# Patient Record
Sex: FEMALE | Race: WHITE | NOT HISPANIC OR LATINO | Employment: FULL TIME | URBAN - METROPOLITAN AREA
[De-identification: names, ages, dates, MRNs, and addresses within clinical notes are randomized per-mention and may not be internally consistent; named-entity substitution may affect disease eponyms.]

---

## 2018-03-27 ENCOUNTER — OFFICE VISIT CONVERTED (OUTPATIENT)
Dept: FAMILY MEDICINE CLINIC | Facility: CLINIC | Age: 23
End: 2018-03-27
Attending: NURSE PRACTITIONER

## 2018-04-16 ENCOUNTER — CONVERSION ENCOUNTER (OUTPATIENT)
Dept: FAMILY MEDICINE CLINIC | Facility: CLINIC | Age: 23
End: 2018-04-16

## 2018-04-16 ENCOUNTER — OFFICE VISIT CONVERTED (OUTPATIENT)
Dept: FAMILY MEDICINE CLINIC | Facility: CLINIC | Age: 23
End: 2018-04-16
Attending: NURSE PRACTITIONER

## 2018-04-25 ENCOUNTER — OFFICE VISIT CONVERTED (OUTPATIENT)
Dept: FAMILY MEDICINE CLINIC | Facility: CLINIC | Age: 23
End: 2018-04-25
Attending: NURSE PRACTITIONER

## 2018-05-31 ENCOUNTER — OFFICE VISIT CONVERTED (OUTPATIENT)
Dept: FAMILY MEDICINE CLINIC | Facility: CLINIC | Age: 23
End: 2018-05-31
Attending: NURSE PRACTITIONER

## 2018-06-28 ENCOUNTER — OFFICE VISIT CONVERTED (OUTPATIENT)
Dept: FAMILY MEDICINE CLINIC | Facility: CLINIC | Age: 23
End: 2018-06-28
Attending: NURSE PRACTITIONER

## 2018-06-28 ENCOUNTER — CONVERSION ENCOUNTER (OUTPATIENT)
Dept: FAMILY MEDICINE CLINIC | Facility: CLINIC | Age: 23
End: 2018-06-28

## 2018-07-16 ENCOUNTER — OFFICE VISIT CONVERTED (OUTPATIENT)
Dept: FAMILY MEDICINE CLINIC | Facility: CLINIC | Age: 23
End: 2018-07-16
Attending: NURSE PRACTITIONER

## 2019-01-14 ENCOUNTER — OFFICE VISIT CONVERTED (OUTPATIENT)
Dept: FAMILY MEDICINE CLINIC | Facility: CLINIC | Age: 24
End: 2019-01-14
Attending: NURSE PRACTITIONER

## 2019-06-17 ENCOUNTER — CONVERSION ENCOUNTER (OUTPATIENT)
Dept: FAMILY MEDICINE CLINIC | Facility: CLINIC | Age: 24
End: 2019-06-17

## 2019-06-17 ENCOUNTER — OFFICE VISIT CONVERTED (OUTPATIENT)
Dept: FAMILY MEDICINE CLINIC | Facility: CLINIC | Age: 24
End: 2019-06-17
Attending: NURSE PRACTITIONER

## 2019-06-17 ENCOUNTER — HOSPITAL ENCOUNTER (OUTPATIENT)
Dept: FAMILY MEDICINE CLINIC | Facility: CLINIC | Age: 24
Discharge: HOME | End: 2019-06-17
Attending: NURSE PRACTITIONER

## 2019-06-17 LAB
25(OH)D3 SERPL-MCNC: 27.4 NG/ML (ref 30–100)
ALBUMIN SERPL-MCNC: 4.6 G/DL (ref 3.5–5)
ALBUMIN/GLOB SERPL: 1.9 {RATIO} (ref 1.4–2.6)
ALP SERPL-CCNC: 42 U/L (ref 42–98)
ALT SERPL-CCNC: 11 U/L (ref 10–40)
ANION GAP SERPL CALC-SCNC: 17 MMOL/L (ref 8–19)
AST SERPL-CCNC: 15 U/L (ref 15–50)
BASOPHILS # BLD AUTO: 0.04 10*3/UL (ref 0–0.2)
BASOPHILS NFR BLD AUTO: 0.4 % (ref 0–3)
BILIRUB SERPL-MCNC: 0.92 MG/DL (ref 0.2–1.3)
BUN SERPL-MCNC: 10 MG/DL (ref 5–25)
BUN/CREAT SERPL: 13 {RATIO} (ref 6–20)
CALCIUM SERPL-MCNC: 9 MG/DL (ref 8.7–10.4)
CHLORIDE SERPL-SCNC: 104 MMOL/L (ref 99–111)
CONV ABS IMM GRAN: 0.04 10*3/UL (ref 0–0.2)
CONV CO2: 26 MMOL/L (ref 22–32)
CONV IMMATURE GRAN: 0.4 % (ref 0–1.8)
CONV TOTAL PROTEIN: 7 G/DL (ref 6.3–8.2)
CREAT UR-MCNC: 0.75 MG/DL (ref 0.5–0.9)
DEPRECATED RDW RBC AUTO: 40.2 FL (ref 36.4–46.3)
EOSINOPHIL # BLD AUTO: 0.13 10*3/UL (ref 0–0.7)
EOSINOPHIL # BLD AUTO: 1.3 % (ref 0–7)
ERYTHROCYTE [DISTWIDTH] IN BLOOD BY AUTOMATED COUNT: 11.7 % (ref 11.7–14.4)
FERRITIN SERPL-MCNC: 56 NG/ML (ref 10–200)
FOLATE SERPL-MCNC: 16.6 NG/ML (ref 4.8–20)
GFR SERPLBLD BASED ON 1.73 SQ M-ARVRAT: >60 ML/MIN/{1.73_M2}
GLOBULIN UR ELPH-MCNC: 2.4 G/DL (ref 2–3.5)
GLUCOSE SERPL-MCNC: 82 MG/DL (ref 65–99)
HBA1C MFR BLD: 13.7 G/DL (ref 12–16)
HCT VFR BLD AUTO: 41.9 % (ref 37–47)
IRON SATN MFR SERPL: 47 % (ref 20–55)
IRON SERPL-MCNC: 152 UG/DL (ref 60–170)
LYMPHOCYTES # BLD AUTO: 2.15 10*3/UL (ref 1–5)
MCH RBC QN AUTO: 31.2 PG (ref 27–31)
MCHC RBC AUTO-ENTMCNC: 32.7 G/DL (ref 33–37)
MCV RBC AUTO: 95.4 FL (ref 81–99)
MONOCYTES # BLD AUTO: 0.83 10*3/UL (ref 0.2–1.2)
MONOCYTES NFR BLD AUTO: 8.5 % (ref 3–10)
NEUTROPHILS # BLD AUTO: 6.59 10*3/UL (ref 2–8)
NEUTROPHILS NFR BLD AUTO: 67.4 % (ref 30–85)
NRBC CBCN: 0 % (ref 0–0.7)
OSMOLALITY SERPL CALC.SUM OF ELEC: 292 MOSM/KG (ref 273–304)
PLATELET # BLD AUTO: 217 10*3/UL (ref 130–400)
PMV BLD AUTO: 11.2 FL (ref 9.4–12.3)
POTASSIUM SERPL-SCNC: 4.8 MMOL/L (ref 3.5–5.3)
RBC # BLD AUTO: 4.39 10*6/UL (ref 4.2–5.4)
SODIUM SERPL-SCNC: 142 MMOL/L (ref 135–147)
T4 FREE SERPL-MCNC: 2.5 NG/DL (ref 0.9–1.8)
TIBC SERPL-MCNC: 322 UG/DL (ref 245–450)
TRANSFERRIN SERPL-MCNC: 225 MG/DL (ref 250–380)
TSH SERPL-ACNC: 0.45 M[IU]/L (ref 0.27–4.2)
VARIANT LYMPHS NFR BLD MANUAL: 22 % (ref 20–45)
VIT B12 SERPL-MCNC: 456 PG/ML (ref 211–911)
WBC # BLD AUTO: 9.78 10*3/UL (ref 4.8–10.8)

## 2019-07-01 ENCOUNTER — HOSPITAL ENCOUNTER (OUTPATIENT)
Dept: CARDIOLOGY | Facility: HOSPITAL | Age: 24
Discharge: HOME | End: 2019-07-01
Attending: NURSE PRACTITIONER

## 2019-07-24 ENCOUNTER — HOSPITAL ENCOUNTER (OUTPATIENT)
Dept: FAMILY MEDICINE CLINIC | Facility: CLINIC | Age: 24
Discharge: HOME | End: 2019-07-24
Attending: NURSE PRACTITIONER

## 2019-07-24 ENCOUNTER — OFFICE VISIT CONVERTED (OUTPATIENT)
Dept: FAMILY MEDICINE CLINIC | Facility: CLINIC | Age: 24
End: 2019-07-24
Attending: NURSE PRACTITIONER

## 2019-07-24 LAB — 25(OH)D3 SERPL-MCNC: 28.6 NG/ML (ref 30–100)

## 2019-07-26 LAB — BACTERIA SPEC AEROBE CULT: NORMAL

## 2019-07-29 ENCOUNTER — HOSPITAL ENCOUNTER (OUTPATIENT)
Dept: OTHER | Facility: HOSPITAL | Age: 24
Discharge: HOME | End: 2019-07-29
Attending: NURSE PRACTITIONER

## 2020-06-15 ENCOUNTER — HOSPITAL ENCOUNTER (OUTPATIENT)
Dept: OTHER | Facility: HOSPITAL | Age: 25
Discharge: HOME | End: 2020-06-15

## 2020-06-15 LAB — HCG INTACT+B SERPL-ACNC: ABNORMAL M[IU]/ML (ref 0–5)

## 2020-06-22 ENCOUNTER — HOSPITAL ENCOUNTER (OUTPATIENT)
Dept: FAMILY MEDICINE CLINIC | Facility: CLINIC | Age: 25
Discharge: HOME | End: 2020-06-22
Attending: NURSE PRACTITIONER

## 2020-06-22 ENCOUNTER — OFFICE VISIT CONVERTED (OUTPATIENT)
Dept: FAMILY MEDICINE CLINIC | Facility: CLINIC | Age: 25
End: 2020-06-22
Attending: NURSE PRACTITIONER

## 2020-06-22 LAB
25(OH)D3 SERPL-MCNC: 31.7 NG/ML (ref 30–100)
ALBUMIN SERPL-MCNC: 4.3 G/DL (ref 3.5–5)
ALBUMIN/GLOB SERPL: 1.9 {RATIO} (ref 1.4–2.6)
ALP SERPL-CCNC: 40 U/L (ref 42–98)
ALT SERPL-CCNC: 9 U/L (ref 10–40)
ANION GAP SERPL CALC-SCNC: 17 MMOL/L (ref 8–19)
AST SERPL-CCNC: 13 U/L (ref 15–50)
BASOPHILS # BLD AUTO: 0.05 10*3/UL (ref 0–0.2)
BASOPHILS NFR BLD AUTO: 0.5 % (ref 0–3)
BILIRUB SERPL-MCNC: 0.68 MG/DL (ref 0.2–1.3)
BUN SERPL-MCNC: 11 MG/DL (ref 5–25)
BUN/CREAT SERPL: 20 {RATIO} (ref 6–20)
CALCIUM SERPL-MCNC: 8.9 MG/DL (ref 8.7–10.4)
CHLORIDE SERPL-SCNC: 103 MMOL/L (ref 99–111)
CHOLEST SERPL-MCNC: 127 MG/DL (ref 107–200)
CHOLEST/HDLC SERPL: 2.6 {RATIO} (ref 3–6)
CONV ABS IMM GRAN: 0.04 10*3/UL (ref 0–0.2)
CONV CO2: 21 MMOL/L (ref 22–32)
CONV IMMATURE GRAN: 0.4 % (ref 0–1.8)
CONV TOTAL PROTEIN: 6.6 G/DL (ref 6.3–8.2)
CREAT UR-MCNC: 0.56 MG/DL (ref 0.5–0.9)
DEPRECATED RDW RBC AUTO: 41.1 FL (ref 36.4–46.3)
EOSINOPHIL # BLD AUTO: 0.22 10*3/UL (ref 0–0.7)
EOSINOPHIL # BLD AUTO: 2.1 % (ref 0–7)
ERYTHROCYTE [DISTWIDTH] IN BLOOD BY AUTOMATED COUNT: 11.8 % (ref 11.7–14.4)
EST. AVERAGE GLUCOSE BLD GHB EST-MCNC: 103 MG/DL
GFR SERPLBLD BASED ON 1.73 SQ M-ARVRAT: >60 ML/MIN/{1.73_M2}
GLOBULIN UR ELPH-MCNC: 2.3 G/DL (ref 2–3.5)
GLUCOSE SERPL-MCNC: 90 MG/DL (ref 65–99)
HBA1C MFR BLD: 5.2 % (ref 3.5–5.7)
HCT VFR BLD AUTO: 40.7 % (ref 37–47)
HDLC SERPL-MCNC: 48 MG/DL (ref 40–60)
HGB BLD-MCNC: 13.4 G/DL (ref 12–16)
LDLC SERPL CALC-MCNC: 65 MG/DL (ref 70–100)
LYMPHOCYTES # BLD AUTO: 1.48 10*3/UL (ref 1–5)
LYMPHOCYTES NFR BLD AUTO: 14.1 % (ref 20–45)
MCH RBC QN AUTO: 31.8 PG (ref 27–31)
MCHC RBC AUTO-ENTMCNC: 32.9 G/DL (ref 33–37)
MCV RBC AUTO: 96.4 FL (ref 81–99)
MONOCYTES # BLD AUTO: 0.75 10*3/UL (ref 0.2–1.2)
MONOCYTES NFR BLD AUTO: 7.2 % (ref 3–10)
NEUTROPHILS # BLD AUTO: 7.94 10*3/UL (ref 2–8)
NEUTROPHILS NFR BLD AUTO: 75.7 % (ref 30–85)
NRBC CBCN: 0 % (ref 0–0.7)
OSMOLALITY SERPL CALC.SUM OF ELEC: 283 MOSM/KG (ref 273–304)
PLATELET # BLD AUTO: 177 10*3/UL (ref 130–400)
PMV BLD AUTO: 11.4 FL (ref 9.4–12.3)
POTASSIUM SERPL-SCNC: 4.4 MMOL/L (ref 3.5–5.3)
RBC # BLD AUTO: 4.22 10*6/UL (ref 4.2–5.4)
SODIUM SERPL-SCNC: 137 MMOL/L (ref 135–147)
TRIGL SERPL-MCNC: 70 MG/DL (ref 40–150)
TSH SERPL-ACNC: 0.08 M[IU]/L (ref 0.27–4.2)
VLDLC SERPL-MCNC: 14 MG/DL (ref 5–37)
WBC # BLD AUTO: 10.48 10*3/UL (ref 4.8–10.8)

## 2020-07-07 ENCOUNTER — HOSPITAL ENCOUNTER (OUTPATIENT)
Dept: OTHER | Facility: HOSPITAL | Age: 25
Discharge: HOME | End: 2020-07-07
Attending: OBSTETRICS & GYNECOLOGY

## 2020-07-09 LAB — BACTERIA UR CULT: NORMAL

## 2020-08-24 ENCOUNTER — OFFICE VISIT CONVERTED (OUTPATIENT)
Dept: OTOLARYNGOLOGY | Facility: CLINIC | Age: 25
End: 2020-08-24
Attending: OTOLARYNGOLOGY

## 2020-12-04 ENCOUNTER — HOSPITAL ENCOUNTER (OUTPATIENT)
Dept: LABOR AND DELIVERY | Facility: HOSPITAL | Age: 25
Discharge: HOME | End: 2020-12-04
Attending: OBSTETRICS & GYNECOLOGY

## 2021-02-08 ENCOUNTER — HOSPITAL ENCOUNTER (OUTPATIENT)
Dept: LABOR AND DELIVERY | Facility: HOSPITAL | Age: 26
Discharge: HOME | End: 2021-02-08
Attending: OBSTETRICS & GYNECOLOGY

## 2021-02-08 ENCOUNTER — HOSPITAL ENCOUNTER (OUTPATIENT)
Dept: PREADMISSION TESTING | Facility: HOSPITAL | Age: 26
Discharge: HOME | End: 2021-02-08
Attending: OBSTETRICS & GYNECOLOGY

## 2021-02-09 LAB — SARS-COV-2 RNA SPEC QL NAA+PROBE: NOT DETECTED

## 2021-03-09 ENCOUNTER — OFFICE VISIT CONVERTED (OUTPATIENT)
Dept: FAMILY MEDICINE CLINIC | Facility: CLINIC | Age: 26
End: 2021-03-09
Attending: FAMILY MEDICINE

## 2021-05-09 VITALS
DIASTOLIC BLOOD PRESSURE: 60 MMHG | WEIGHT: 163 LBS | SYSTOLIC BLOOD PRESSURE: 102 MMHG | BODY MASS INDEX: 26.2 KG/M2 | OXYGEN SATURATION: 98 % | HEART RATE: 96 BPM | TEMPERATURE: 98.2 F | HEIGHT: 66 IN

## 2021-05-09 VITALS
HEIGHT: 67 IN | TEMPERATURE: 96.4 F | DIASTOLIC BLOOD PRESSURE: 70 MMHG | WEIGHT: 155.5 LBS | OXYGEN SATURATION: 98 % | BODY MASS INDEX: 24.4 KG/M2 | SYSTOLIC BLOOD PRESSURE: 122 MMHG | HEART RATE: 80 BPM

## 2021-05-09 VITALS
OXYGEN SATURATION: 98 % | BODY MASS INDEX: 23.71 KG/M2 | WEIGHT: 151.06 LBS | TEMPERATURE: 98 F | SYSTOLIC BLOOD PRESSURE: 118 MMHG | HEIGHT: 67 IN | DIASTOLIC BLOOD PRESSURE: 68 MMHG | HEART RATE: 90 BPM

## 2021-05-09 VITALS
BODY MASS INDEX: 24.38 KG/M2 | HEART RATE: 82 BPM | HEIGHT: 67 IN | SYSTOLIC BLOOD PRESSURE: 122 MMHG | OXYGEN SATURATION: 97 % | TEMPERATURE: 97.2 F | DIASTOLIC BLOOD PRESSURE: 78 MMHG | WEIGHT: 155.37 LBS

## 2021-05-09 VITALS
HEART RATE: 94 BPM | OXYGEN SATURATION: 98 % | BODY MASS INDEX: 24.01 KG/M2 | TEMPERATURE: 98.4 F | DIASTOLIC BLOOD PRESSURE: 70 MMHG | SYSTOLIC BLOOD PRESSURE: 114 MMHG | WEIGHT: 153 LBS | HEIGHT: 67 IN

## 2021-05-09 VITALS — HEART RATE: 87 BPM | OXYGEN SATURATION: 99 % | TEMPERATURE: 97.4 F

## 2021-05-09 VITALS
TEMPERATURE: 97.2 F | DIASTOLIC BLOOD PRESSURE: 64 MMHG | SYSTOLIC BLOOD PRESSURE: 106 MMHG | HEART RATE: 104 BPM | WEIGHT: 161.44 LBS | OXYGEN SATURATION: 99 %

## 2021-05-09 VITALS
WEIGHT: 157 LBS | OXYGEN SATURATION: 97 % | DIASTOLIC BLOOD PRESSURE: 74 MMHG | HEART RATE: 80 BPM | TEMPERATURE: 97.6 F | SYSTOLIC BLOOD PRESSURE: 110 MMHG

## 2021-05-09 VITALS
BODY MASS INDEX: 24.82 KG/M2 | TEMPERATURE: 98.1 F | HEIGHT: 67 IN | DIASTOLIC BLOOD PRESSURE: 64 MMHG | OXYGEN SATURATION: 98 % | HEART RATE: 92 BPM | WEIGHT: 158.12 LBS | SYSTOLIC BLOOD PRESSURE: 122 MMHG

## 2021-05-09 VITALS
SYSTOLIC BLOOD PRESSURE: 122 MMHG | HEIGHT: 67 IN | TEMPERATURE: 97.3 F | BODY MASS INDEX: 24.02 KG/M2 | OXYGEN SATURATION: 98 % | WEIGHT: 153.06 LBS | HEART RATE: 100 BPM | DIASTOLIC BLOOD PRESSURE: 72 MMHG

## 2021-05-09 VITALS
SYSTOLIC BLOOD PRESSURE: 112 MMHG | HEART RATE: 84 BPM | OXYGEN SATURATION: 98 % | WEIGHT: 153 LBS | TEMPERATURE: 98.2 F | DIASTOLIC BLOOD PRESSURE: 74 MMHG

## 2021-05-10 ENCOUNTER — OFFICE VISIT CONVERTED (OUTPATIENT)
Dept: OTOLARYNGOLOGY | Facility: CLINIC | Age: 26
End: 2021-05-10
Attending: OTOLARYNGOLOGY

## 2021-05-14 VITALS — HEIGHT: 66 IN | TEMPERATURE: 97.3 F | BODY MASS INDEX: 27.84 KG/M2 | WEIGHT: 173.25 LBS

## 2021-05-23 ENCOUNTER — TRANSCRIBE ORDERS (OUTPATIENT)
Dept: ADMINISTRATIVE | Facility: HOSPITAL | Age: 26
End: 2021-05-23

## 2021-05-23 DIAGNOSIS — J34.2 DEVIATED SEPTUM: ICD-10-CM

## 2021-05-23 DIAGNOSIS — J32.9 CHRONIC SINUSITIS, UNSPECIFIED LOCATION: Primary | ICD-10-CM

## 2021-07-15 VITALS — WEIGHT: 174 LBS | BODY MASS INDEX: 27.97 KG/M2 | HEIGHT: 66 IN | TEMPERATURE: 97.7 F

## 2021-07-27 ENCOUNTER — TRANSCRIBE ORDERS (OUTPATIENT)
Dept: ADMINISTRATIVE | Facility: HOSPITAL | Age: 26
End: 2021-07-27

## 2021-07-27 DIAGNOSIS — E04.2 MULTIPLE THYROID NODULES: Primary | ICD-10-CM

## 2021-07-30 ENCOUNTER — APPOINTMENT (OUTPATIENT)
Dept: ULTRASOUND IMAGING | Facility: HOSPITAL | Age: 26
End: 2021-07-30

## 2021-08-09 ENCOUNTER — HOSPITAL ENCOUNTER (OUTPATIENT)
Dept: ULTRASOUND IMAGING | Facility: HOSPITAL | Age: 26
Discharge: HOME | End: 2021-08-09
Admitting: INTERNAL MEDICINE

## 2021-08-09 DIAGNOSIS — E04.2 MULTIPLE THYROID NODULES: ICD-10-CM

## 2021-08-09 PROCEDURE — 76536 US EXAM OF HEAD AND NECK: CPT

## 2021-12-28 ENCOUNTER — TRANSCRIBE ORDERS (OUTPATIENT)
Dept: GENERAL RADIOLOGY | Facility: HOSPITAL | Age: 26
End: 2021-12-28

## 2021-12-28 ENCOUNTER — HOSPITAL ENCOUNTER (OUTPATIENT)
Dept: GENERAL RADIOLOGY | Facility: HOSPITAL | Age: 26
Discharge: HOME | End: 2021-12-28
Admitting: NURSE PRACTITIONER

## 2021-12-28 DIAGNOSIS — R05.3 PERSISTENT COUGH: Primary | ICD-10-CM

## 2021-12-28 DIAGNOSIS — R05.3 PERSISTENT COUGH: ICD-10-CM

## 2021-12-28 PROCEDURE — 71046 X-RAY EXAM CHEST 2 VIEWS: CPT

## 2022-01-04 ENCOUNTER — OFFICE VISIT (OUTPATIENT)
Dept: FAMILY MEDICINE CLINIC | Facility: CLINIC | Age: 27
End: 2022-01-04

## 2022-01-04 VITALS
WEIGHT: 167 LBS | TEMPERATURE: 96.2 F | DIASTOLIC BLOOD PRESSURE: 76 MMHG | OXYGEN SATURATION: 98 % | HEIGHT: 67 IN | SYSTOLIC BLOOD PRESSURE: 128 MMHG | HEART RATE: 111 BPM | BODY MASS INDEX: 26.21 KG/M2

## 2022-01-04 DIAGNOSIS — M54.2 CERVICAL PAIN (NECK): Primary | ICD-10-CM

## 2022-01-04 DIAGNOSIS — M25.60 LIMITED JOINT RANGE OF MOTION: ICD-10-CM

## 2022-01-04 PROBLEM — G43.909 MIGRAINE HEADACHE: Status: ACTIVE | Noted: 2022-01-04

## 2022-01-04 PROBLEM — J30.2 SEASONAL ALLERGIC RHINITIS: Status: ACTIVE | Noted: 2022-01-04

## 2022-01-04 PROBLEM — I87.2 PERIPHERAL VENOUS INSUFFICIENCY: Status: ACTIVE | Noted: 2022-01-04

## 2022-01-04 PROBLEM — F41.9 ANXIETY: Status: ACTIVE | Noted: 2022-01-04

## 2022-01-04 PROBLEM — J34.2 DEVIATED NASAL SEPTUM: Status: ACTIVE | Noted: 2022-01-04

## 2022-01-04 PROBLEM — R00.2 PALPITATIONS: Status: ACTIVE | Noted: 2022-01-04

## 2022-01-04 PROBLEM — J01.91 RECURRENT ACUTE SINUSITIS: Status: ACTIVE | Noted: 2022-01-04

## 2022-01-04 PROBLEM — J34.3 HYPERTROPHY OF BOTH INFERIOR NASAL TURBINATES: Status: ACTIVE | Noted: 2022-01-04

## 2022-01-04 PROCEDURE — 96372 THER/PROPH/DIAG INJ SC/IM: CPT | Performed by: NURSE PRACTITIONER

## 2022-01-04 PROCEDURE — 99213 OFFICE O/P EST LOW 20 MIN: CPT | Performed by: NURSE PRACTITIONER

## 2022-01-04 RX ORDER — KETOROLAC TROMETHAMINE 10 MG/1
10 TABLET, FILM COATED ORAL EVERY 6 HOURS PRN
Qty: 20 TABLET | Refills: 0 | Status: SHIPPED | OUTPATIENT
Start: 2022-01-04 | End: 2022-01-09

## 2022-01-04 RX ORDER — KETOROLAC TROMETHAMINE 30 MG/ML
30 INJECTION, SOLUTION INTRAMUSCULAR; INTRAVENOUS EVERY 6 HOURS PRN
Status: SHIPPED | OUTPATIENT
Start: 2022-01-04 | End: 2022-01-09

## 2022-01-04 RX ORDER — TIZANIDINE 4 MG/1
4 TABLET ORAL EVERY 8 HOURS PRN
Qty: 30 TABLET | Refills: 0 | Status: SHIPPED | OUTPATIENT
Start: 2022-01-04 | End: 2022-05-16

## 2022-01-04 RX ADMIN — KETOROLAC TROMETHAMINE 30 MG: 30 INJECTION, SOLUTION INTRAMUSCULAR; INTRAVENOUS at 09:19

## 2022-05-16 PROCEDURE — U0004 COV-19 TEST NON-CDC HGH THRU: HCPCS

## 2022-05-17 ENCOUNTER — TELEPHONE (OUTPATIENT)
Dept: URGENT CARE | Facility: CLINIC | Age: 27
End: 2022-05-17

## 2022-10-15 ENCOUNTER — PATIENT MESSAGE (OUTPATIENT)
Dept: FAMILY MEDICINE CLINIC | Facility: CLINIC | Age: 27
End: 2022-10-15

## 2022-10-15 DIAGNOSIS — T36.95XA ANTIBIOTIC-INDUCED YEAST INFECTION: Primary | ICD-10-CM

## 2022-10-15 DIAGNOSIS — B37.9 ANTIBIOTIC-INDUCED YEAST INFECTION: Primary | ICD-10-CM

## 2022-10-17 RX ORDER — FLUCONAZOLE 150 MG/1
150 TABLET ORAL ONCE
Qty: 1 TABLET | Refills: 0 | Status: SHIPPED | OUTPATIENT
Start: 2022-10-17 | End: 2022-10-17

## 2023-01-03 ENCOUNTER — OFFICE VISIT (OUTPATIENT)
Dept: CARDIOLOGY | Facility: CLINIC | Age: 28
End: 2023-01-03
Payer: COMMERCIAL

## 2023-01-03 VITALS
SYSTOLIC BLOOD PRESSURE: 108 MMHG | WEIGHT: 163 LBS | BODY MASS INDEX: 26.2 KG/M2 | HEIGHT: 66 IN | HEART RATE: 97 BPM | DIASTOLIC BLOOD PRESSURE: 74 MMHG

## 2023-01-03 DIAGNOSIS — R00.2 PALPITATIONS: Primary | ICD-10-CM

## 2023-01-03 DIAGNOSIS — R94.31 ABNORMAL HOLTER EXAM: ICD-10-CM

## 2023-01-03 PROCEDURE — 93000 ELECTROCARDIOGRAM COMPLETE: CPT | Performed by: INTERNAL MEDICINE

## 2023-01-03 PROCEDURE — 99203 OFFICE O/P NEW LOW 30 MIN: CPT | Performed by: INTERNAL MEDICINE

## 2023-01-03 RX ORDER — FLUOXETINE HYDROCHLORIDE 40 MG/1
CAPSULE ORAL
COMMUNITY
Start: 2023-01-01

## 2023-01-03 RX ORDER — FLUOXETINE HYDROCHLORIDE 20 MG/1
CAPSULE ORAL
COMMUNITY
Start: 2023-01-01

## 2023-01-03 RX ORDER — TOPIRAMATE 25 MG/1
25 TABLET ORAL 2 TIMES DAILY
COMMUNITY
Start: 2022-12-02

## 2023-01-19 PROBLEM — R94.31 ABNORMAL HOLTER EXAM: Status: ACTIVE | Noted: 2023-01-19

## 2023-02-20 ENCOUNTER — HOSPITAL ENCOUNTER (OUTPATIENT)
Dept: CARDIOLOGY | Facility: HOSPITAL | Age: 28
Discharge: HOME | End: 2023-02-20
Payer: COMMERCIAL

## 2023-02-20 VITALS — DIASTOLIC BLOOD PRESSURE: 68 MMHG | SYSTOLIC BLOOD PRESSURE: 96 MMHG | HEART RATE: 78 BPM

## 2023-02-20 VITALS
HEIGHT: 66 IN | SYSTOLIC BLOOD PRESSURE: 103 MMHG | DIASTOLIC BLOOD PRESSURE: 68 MMHG | WEIGHT: 163 LBS | BODY MASS INDEX: 26.2 KG/M2

## 2023-02-20 PROCEDURE — 93018 CV STRESS TEST I&R ONLY: CPT | Performed by: INTERNAL MEDICINE

## 2023-02-20 PROCEDURE — 93306 TTE W/DOPPLER COMPLETE: CPT

## 2023-02-20 PROCEDURE — 93306 TTE W/DOPPLER COMPLETE: CPT | Performed by: INTERNAL MEDICINE

## 2023-02-20 PROCEDURE — 93017 CV STRESS TEST TRACING ONLY: CPT

## 2023-02-21 LAB
AORTIC DIMENSIONLESS INDEX: 0.8 (DI)
BH CV ECHO MEAS - AO MAX PG: 4.5 MMHG
BH CV ECHO MEAS - AO MEAN PG: 2.7 MMHG
BH CV ECHO MEAS - AO ROOT DIAM: 2.8 CM
BH CV ECHO MEAS - AO V2 MAX: 106.4 CM/SEC
BH CV ECHO MEAS - AO V2 VTI: 21.5 CM
BH CV ECHO MEAS - AVA(I,D): 2 CM2
BH CV ECHO MEAS - EDV(CUBED): 91.2 ML
BH CV ECHO MEAS - EDV(MOD-SP2): 80 ML
BH CV ECHO MEAS - EDV(MOD-SP4): 78 ML
BH CV ECHO MEAS - EF(MOD-BP): 60.9 %
BH CV ECHO MEAS - EF(MOD-SP2): 62.5 %
BH CV ECHO MEAS - EF(MOD-SP4): 62.8 %
BH CV ECHO MEAS - ESV(CUBED): 28.6 ML
BH CV ECHO MEAS - ESV(MOD-SP2): 30 ML
BH CV ECHO MEAS - ESV(MOD-SP4): 29 ML
BH CV ECHO MEAS - FS: 32.1 %
BH CV ECHO MEAS - IVS/LVPW: 1.03 CM
BH CV ECHO MEAS - IVSD: 0.86 CM
BH CV ECHO MEAS - LAT PEAK E' VEL: 15.7 CM/SEC
BH CV ECHO MEAS - LV DIASTOLIC VOL/BSA (35-75): 42.5 CM2
BH CV ECHO MEAS - LV MASS(C)D: 123.4 GRAMS
BH CV ECHO MEAS - LV MAX PG: 3 MMHG
BH CV ECHO MEAS - LV MEAN PG: 1.48 MMHG
BH CV ECHO MEAS - LV SYSTOLIC VOL/BSA (12-30): 15.8 CM2
BH CV ECHO MEAS - LV V1 MAX: 87.3 CM/SEC
BH CV ECHO MEAS - LV V1 VTI: 17.4 CM
BH CV ECHO MEAS - LVIDD: 4.5 CM
BH CV ECHO MEAS - LVIDS: 3.1 CM
BH CV ECHO MEAS - LVOT AREA: 2.47 CM2
BH CV ECHO MEAS - LVOT DIAM: 1.77 CM
BH CV ECHO MEAS - LVPWD: 0.84 CM
BH CV ECHO MEAS - MED PEAK E' VEL: 11.6 CM/SEC
BH CV ECHO MEAS - MR MAX PG: 33.4 MMHG
BH CV ECHO MEAS - MR MAX VEL: 288.8 CM/SEC
BH CV ECHO MEAS - MV A DUR: 0.08 SEC
BH CV ECHO MEAS - MV A MAX VEL: 47.5 CM/SEC
BH CV ECHO MEAS - MV DEC SLOPE: 257 CM/SEC2
BH CV ECHO MEAS - MV DEC TIME: 261 MSEC
BH CV ECHO MEAS - MV E MAX VEL: 59.7 CM/SEC
BH CV ECHO MEAS - MV E/A: 1.26
BH CV ECHO MEAS - MV MAX PG: 2.07 MMHG
BH CV ECHO MEAS - MV MEAN PG: 1.15 MMHG
BH CV ECHO MEAS - MV P1/2T: 82.8 MSEC
BH CV ECHO MEAS - MV V2 VTI: 21.7 CM
BH CV ECHO MEAS - MVA(P1/2T): 2.7 CM2
BH CV ECHO MEAS - MVA(VTI): 1.98 CM2
BH CV ECHO MEAS - PA ACC TIME: 0.15 SEC
BH CV ECHO MEAS - PA PR(ACCEL): 13.6 MMHG
BH CV ECHO MEAS - PA V2 MAX: 114.4 CM/SEC
BH CV ECHO MEAS - PI END-D VEL: 66.9 CM/SEC
BH CV ECHO MEAS - PULM A REVS DUR: 0.08 SEC
BH CV ECHO MEAS - PULM A REVS VEL: 22.3 CM/SEC
BH CV ECHO MEAS - PULM DIAS VEL: 48.5 CM/SEC
BH CV ECHO MEAS - PULM S/D: 0.9
BH CV ECHO MEAS - PULM SYS VEL: 43.7 CM/SEC
BH CV ECHO MEAS - RAP SYSTOLE: 8 MMHG
BH CV ECHO MEAS - RV MAX PG: 2.7 MMHG
BH CV ECHO MEAS - RV V1 MAX: 81.5 CM/SEC
BH CV ECHO MEAS - RV V1 VTI: 16.3 CM
BH CV ECHO MEAS - RVSP: 20.4 MMHG
BH CV ECHO MEAS - SI(MOD-SP2): 27.3 ML/M2
BH CV ECHO MEAS - SI(MOD-SP4): 26.7 ML/M2
BH CV ECHO MEAS - SV(LVOT): 43 ML
BH CV ECHO MEAS - SV(MOD-SP2): 50 ML
BH CV ECHO MEAS - SV(MOD-SP4): 49 ML
BH CV ECHO MEAS - TAPSE (>1.6): 1.96 CM
BH CV ECHO MEAS - TR MAX PG: 12.4 MMHG
BH CV ECHO MEAS - TR MAX VEL: 175.9 CM/SEC
BH CV ECHO MEASUREMENTS AVERAGE E/E' RATIO: 4.37
BH CV STRESS BP STAGE 1: NORMAL
BH CV STRESS BP STAGE 2: NORMAL
BH CV STRESS DURATION MIN STAGE 1: 3
BH CV STRESS DURATION MIN STAGE 2: 3
BH CV STRESS DURATION MIN STAGE 3: 0
BH CV STRESS DURATION SEC STAGE 1: 0
BH CV STRESS DURATION SEC STAGE 2: 0
BH CV STRESS DURATION SEC STAGE 3: 37
BH CV STRESS GRADE STAGE 1: 10
BH CV STRESS GRADE STAGE 2: 12
BH CV STRESS GRADE STAGE 3: 14
BH CV STRESS HR STAGE 1: 111
BH CV STRESS HR STAGE 2: 150
BH CV STRESS HR STAGE 3: 164
BH CV STRESS METS STAGE 1: 4.6
BH CV STRESS METS STAGE 2: 7.1
BH CV STRESS METS STAGE 3: 7.6
BH CV STRESS PROTOCOL 1: NORMAL
BH CV STRESS RECOVERY BP: NORMAL MMHG
BH CV STRESS RECOVERY HR: 91 BPM
BH CV STRESS SPEED STAGE 1: 1.7
BH CV STRESS SPEED STAGE 2: 2.5
BH CV STRESS SPEED STAGE 3: 3.4
BH CV STRESS STAGE 1: 1
BH CV STRESS STAGE 2: 2
BH CV STRESS STAGE 3: 3
BH CV XLRA - RV BASE: 2.7 CM
BH CV XLRA - RV LENGTH: 7 CM
BH CV XLRA - RV MID: 2.3 CM
BH CV XLRA - TDI S': 10.3 CM/SEC
LEFT ATRIUM VOLUME INDEX: 19 ML/M2
MAXIMAL PREDICTED HEART RATE: 191 BPM
MAXIMAL PREDICTED HEART RATE: 191 BPM
PERCENT MAX PREDICTED HR: 86.39 %
STRESS BASELINE BP: NORMAL MMHG
STRESS BASELINE HR: 85 BPM
STRESS PERCENT HR: 102 %
STRESS POST ESTIMATED WORKLOAD: 7.7 METS
STRESS POST EXERCISE DUR MIN: 6 MIN
STRESS POST EXERCISE DUR SEC: 37 SEC
STRESS POST PEAK BP: NORMAL MMHG
STRESS POST PEAK HR: 165 BPM
STRESS TARGET HR: 162 BPM
STRESS TARGET HR: 162 BPM

## 2023-05-11 ENCOUNTER — HOSPITAL ENCOUNTER (EMERGENCY)
Facility: HOSPITAL | Age: 28
Discharge: HOME | End: 2023-05-11
Attending: EMERGENCY MEDICINE
Payer: COMMERCIAL

## 2023-05-11 VITALS
WEIGHT: 164.46 LBS | SYSTOLIC BLOOD PRESSURE: 111 MMHG | HEART RATE: 72 BPM | TEMPERATURE: 99.6 F | BODY MASS INDEX: 25.81 KG/M2 | RESPIRATION RATE: 16 BRPM | HEIGHT: 67 IN | OXYGEN SATURATION: 98 % | DIASTOLIC BLOOD PRESSURE: 73 MMHG

## 2023-05-11 DIAGNOSIS — G43.119 INTRACTABLE MIGRAINE WITH AURA WITHOUT STATUS MIGRAINOSUS: Primary | ICD-10-CM

## 2023-05-11 PROCEDURE — 99282 EMERGENCY DEPT VISIT SF MDM: CPT

## 2023-05-11 PROCEDURE — 25010000002 KETOROLAC TROMETHAMINE PER 15 MG: Performed by: EMERGENCY MEDICINE

## 2023-05-11 PROCEDURE — 96372 THER/PROPH/DIAG INJ SC/IM: CPT

## 2023-05-11 RX ORDER — KETOROLAC TROMETHAMINE 30 MG/ML
60 INJECTION, SOLUTION INTRAMUSCULAR; INTRAVENOUS ONCE
Status: COMPLETED | OUTPATIENT
Start: 2023-05-11 | End: 2023-05-11

## 2023-05-11 RX ORDER — ONDANSETRON 4 MG/1
4 TABLET, ORALLY DISINTEGRATING ORAL EVERY 6 HOURS PRN
Qty: 15 TABLET | Refills: 0 | Status: SHIPPED | OUTPATIENT
Start: 2023-05-11

## 2023-05-11 RX ORDER — BUTALBITAL, ACETAMINOPHEN AND CAFFEINE 50; 325; 40 MG/1; MG/1; MG/1
1 TABLET ORAL EVERY 6 HOURS PRN
Qty: 12 TABLET | Refills: 0 | Status: SHIPPED | OUTPATIENT
Start: 2023-05-11

## 2023-05-11 RX ADMIN — KETOROLAC TROMETHAMINE 60 MG: 30 INJECTION, SOLUTION INTRAMUSCULAR at 11:34

## 2023-05-25 ENCOUNTER — OFFICE VISIT (OUTPATIENT)
Dept: FAMILY MEDICINE CLINIC | Facility: CLINIC | Age: 28
End: 2023-05-25
Payer: COMMERCIAL

## 2023-05-25 VITALS
DIASTOLIC BLOOD PRESSURE: 72 MMHG | TEMPERATURE: 97.2 F | WEIGHT: 160 LBS | HEART RATE: 77 BPM | SYSTOLIC BLOOD PRESSURE: 116 MMHG | BODY MASS INDEX: 25.11 KG/M2 | OXYGEN SATURATION: 99 % | HEIGHT: 67 IN

## 2023-05-25 DIAGNOSIS — G43.709 CHRONIC MIGRAINE WITHOUT AURA WITHOUT STATUS MIGRAINOSUS, NOT INTRACTABLE: Primary | ICD-10-CM

## 2023-05-25 PROCEDURE — 1160F RVW MEDS BY RX/DR IN RCRD: CPT | Performed by: NURSE PRACTITIONER

## 2023-05-25 PROCEDURE — 99214 OFFICE O/P EST MOD 30 MIN: CPT | Performed by: NURSE PRACTITIONER

## 2023-05-25 PROCEDURE — 1159F MED LIST DOCD IN RCRD: CPT | Performed by: NURSE PRACTITIONER

## 2023-05-25 RX ORDER — GALCANEZUMAB 120 MG/ML
120 INJECTION, SOLUTION SUBCUTANEOUS
Qty: 1.12 ML | Refills: 2 | Status: SHIPPED | OUTPATIENT
Start: 2023-05-25

## 2023-09-01 DIAGNOSIS — G43.709 CHRONIC MIGRAINE WITHOUT AURA WITHOUT STATUS MIGRAINOSUS, NOT INTRACTABLE: ICD-10-CM

## 2023-09-01 RX ORDER — GALCANEZUMAB 120 MG/ML
120 INJECTION, SOLUTION SUBCUTANEOUS
Qty: 1.12 ML | Refills: 0 | Status: SHIPPED | OUTPATIENT
Start: 2023-09-01

## 2023-10-20 DIAGNOSIS — G43.709 CHRONIC MIGRAINE WITHOUT AURA WITHOUT STATUS MIGRAINOSUS, NOT INTRACTABLE: ICD-10-CM

## 2023-10-20 RX ORDER — GALCANEZUMAB 120 MG/ML
INJECTION, SOLUTION SUBCUTANEOUS
Qty: 1 ML | Refills: 0 | Status: SHIPPED | OUTPATIENT
Start: 2023-10-20

## 2024-02-19 ENCOUNTER — OFFICE VISIT (OUTPATIENT)
Dept: FAMILY MEDICINE CLINIC | Facility: CLINIC | Age: 29
End: 2024-02-19
Payer: COMMERCIAL

## 2024-02-19 VITALS
HEIGHT: 67 IN | HEART RATE: 80 BPM | OXYGEN SATURATION: 99 % | DIASTOLIC BLOOD PRESSURE: 72 MMHG | BODY MASS INDEX: 27.94 KG/M2 | WEIGHT: 178 LBS | SYSTOLIC BLOOD PRESSURE: 120 MMHG | TEMPERATURE: 96.7 F

## 2024-02-19 DIAGNOSIS — E55.9 VITAMIN D DEFICIENCY: ICD-10-CM

## 2024-02-19 DIAGNOSIS — R31.9 HEMATURIA, UNSPECIFIED TYPE: ICD-10-CM

## 2024-02-19 DIAGNOSIS — E05.00 GRAVES DISEASE: ICD-10-CM

## 2024-02-19 DIAGNOSIS — R41.840 IMPAIRED CONCENTRATION: ICD-10-CM

## 2024-02-19 DIAGNOSIS — F41.8 ANXIETY WITH DEPRESSION: ICD-10-CM

## 2024-02-19 DIAGNOSIS — R53.83 FATIGUE, UNSPECIFIED TYPE: ICD-10-CM

## 2024-02-19 DIAGNOSIS — Z01.419 WELL FEMALE EXAM WITH ROUTINE GYNECOLOGICAL EXAM: Primary | ICD-10-CM

## 2024-02-19 DIAGNOSIS — Z11.59 ENCOUNTER FOR HEPATITIS C SCREENING TEST FOR LOW RISK PATIENT: ICD-10-CM

## 2024-02-19 DIAGNOSIS — N64.4 BREAST TENDERNESS IN FEMALE: ICD-10-CM

## 2024-02-19 DIAGNOSIS — R45.89 MOODINESS: ICD-10-CM

## 2024-02-19 LAB
BILIRUB BLD-MCNC: NEGATIVE MG/DL
CLARITY, POC: ABNORMAL
COLOR UR: ABNORMAL
EXPIRATION DATE: ABNORMAL
GLUCOSE UR STRIP-MCNC: NEGATIVE MG/DL
KETONES UR QL: ABNORMAL
LEUKOCYTE EST, POC: NEGATIVE
Lab: ABNORMAL
NITRITE UR-MCNC: NEGATIVE MG/ML
PH UR: 5.5 [PH] (ref 5–8)
PROT UR STRIP-MCNC: ABNORMAL MG/DL
RBC # UR STRIP: ABNORMAL /UL
SP GR UR: 1.02 (ref 1–1.03)
UROBILINOGEN UR QL: ABNORMAL

## 2024-02-19 PROCEDURE — G0123 SCREEN CERV/VAG THIN LAYER: HCPCS | Performed by: NURSE PRACTITIONER

## 2024-02-19 PROCEDURE — 87591 N.GONORRHOEAE DNA AMP PROB: CPT | Performed by: NURSE PRACTITIONER

## 2024-02-19 PROCEDURE — 82728 ASSAY OF FERRITIN: CPT | Performed by: NURSE PRACTITIONER

## 2024-02-19 PROCEDURE — 87491 CHLMYD TRACH DNA AMP PROBE: CPT | Performed by: NURSE PRACTITIONER

## 2024-02-19 PROCEDURE — 84466 ASSAY OF TRANSFERRIN: CPT | Performed by: NURSE PRACTITIONER

## 2024-02-19 PROCEDURE — 81003 URINALYSIS AUTO W/O SCOPE: CPT | Performed by: NURSE PRACTITIONER

## 2024-02-19 PROCEDURE — 84439 ASSAY OF FREE THYROXINE: CPT | Performed by: NURSE PRACTITIONER

## 2024-02-19 PROCEDURE — 82746 ASSAY OF FOLIC ACID SERUM: CPT | Performed by: NURSE PRACTITIONER

## 2024-02-19 PROCEDURE — 99395 PREV VISIT EST AGE 18-39: CPT | Performed by: NURSE PRACTITIONER

## 2024-02-19 PROCEDURE — 36415 COLL VENOUS BLD VENIPUNCTURE: CPT | Performed by: NURSE PRACTITIONER

## 2024-02-19 PROCEDURE — 87624 HPV HI-RISK TYP POOLED RSLT: CPT | Performed by: NURSE PRACTITIONER

## 2024-02-19 PROCEDURE — 80050 GENERAL HEALTH PANEL: CPT | Performed by: NURSE PRACTITIONER

## 2024-02-19 PROCEDURE — 82306 VITAMIN D 25 HYDROXY: CPT | Performed by: NURSE PRACTITIONER

## 2024-02-19 PROCEDURE — 82607 VITAMIN B-12: CPT | Performed by: NURSE PRACTITIONER

## 2024-02-19 PROCEDURE — 87086 URINE CULTURE/COLONY COUNT: CPT | Performed by: NURSE PRACTITIONER

## 2024-02-19 PROCEDURE — 83540 ASSAY OF IRON: CPT | Performed by: NURSE PRACTITIONER

## 2024-02-19 PROCEDURE — 86803 HEPATITIS C AB TEST: CPT | Performed by: NURSE PRACTITIONER

## 2024-02-20 LAB
25(OH)D3 SERPL-MCNC: 23.7 NG/ML (ref 30–100)
ALBUMIN SERPL-MCNC: 4.4 G/DL (ref 3.5–5.2)
ALBUMIN/GLOB SERPL: 1.8 G/DL
ALP SERPL-CCNC: 53 U/L (ref 39–117)
ALT SERPL W P-5'-P-CCNC: 18 U/L (ref 1–33)
ANION GAP SERPL CALCULATED.3IONS-SCNC: 11.8 MMOL/L (ref 5–15)
AST SERPL-CCNC: 19 U/L (ref 1–32)
BASOPHILS # BLD AUTO: 0.05 10*3/MM3 (ref 0–0.2)
BASOPHILS NFR BLD AUTO: 0.5 % (ref 0–1.5)
BILIRUB SERPL-MCNC: 0.5 MG/DL (ref 0–1.2)
BUN SERPL-MCNC: 11 MG/DL (ref 6–20)
BUN/CREAT SERPL: 13.3 (ref 7–25)
CALCIUM SPEC-SCNC: 9.1 MG/DL (ref 8.6–10.5)
CHLORIDE SERPL-SCNC: 103 MMOL/L (ref 98–107)
CO2 SERPL-SCNC: 24.2 MMOL/L (ref 22–29)
CREAT SERPL-MCNC: 0.83 MG/DL (ref 0.57–1)
DEPRECATED RDW RBC AUTO: 38.7 FL (ref 37–54)
EGFRCR SERPLBLD CKD-EPI 2021: 97.4 ML/MIN/1.73
EOSINOPHIL # BLD AUTO: 0.26 10*3/MM3 (ref 0–0.4)
EOSINOPHIL NFR BLD AUTO: 2.4 % (ref 0.3–6.2)
ERYTHROCYTE [DISTWIDTH] IN BLOOD BY AUTOMATED COUNT: 11.5 % (ref 12.3–15.4)
FERRITIN SERPL-MCNC: 38.9 NG/ML (ref 13–150)
FOLATE SERPL-MCNC: 4.07 NG/ML (ref 4.78–24.2)
GLOBULIN UR ELPH-MCNC: 2.4 GM/DL
GLUCOSE SERPL-MCNC: 84 MG/DL (ref 65–99)
HCT VFR BLD AUTO: 40.5 % (ref 34–46.6)
HCV AB SER DONR QL: NORMAL
HGB BLD-MCNC: 13.7 G/DL (ref 12–15.9)
IMM GRANULOCYTES # BLD AUTO: 0.03 10*3/MM3 (ref 0–0.05)
IMM GRANULOCYTES NFR BLD AUTO: 0.3 % (ref 0–0.5)
IRON 24H UR-MRATE: 74 MCG/DL (ref 37–145)
IRON SATN MFR SERPL: 19 % (ref 20–50)
LYMPHOCYTES # BLD AUTO: 2.57 10*3/MM3 (ref 0.7–3.1)
LYMPHOCYTES NFR BLD AUTO: 23.8 % (ref 19.6–45.3)
MCH RBC QN AUTO: 31 PG (ref 26.6–33)
MCHC RBC AUTO-ENTMCNC: 33.8 G/DL (ref 31.5–35.7)
MCV RBC AUTO: 91.6 FL (ref 79–97)
MONOCYTES # BLD AUTO: 0.78 10*3/MM3 (ref 0.1–0.9)
MONOCYTES NFR BLD AUTO: 7.2 % (ref 5–12)
NEUTROPHILS NFR BLD AUTO: 65.8 % (ref 42.7–76)
NEUTROPHILS NFR BLD AUTO: 7.09 10*3/MM3 (ref 1.7–7)
NRBC BLD AUTO-RTO: 0 /100 WBC (ref 0–0.2)
PLATELET # BLD AUTO: 296 10*3/MM3 (ref 140–450)
PMV BLD AUTO: 11 FL (ref 6–12)
POTASSIUM SERPL-SCNC: 4.1 MMOL/L (ref 3.5–5.2)
PROT SERPL-MCNC: 6.8 G/DL (ref 6–8.5)
RBC # BLD AUTO: 4.42 10*6/MM3 (ref 3.77–5.28)
SODIUM SERPL-SCNC: 139 MMOL/L (ref 136–145)
T4 FREE SERPL-MCNC: 1.83 NG/DL (ref 0.93–1.7)
TIBC SERPL-MCNC: 395 MCG/DL (ref 298–536)
TRANSFERRIN SERPL-MCNC: 265 MG/DL (ref 200–360)
TSH SERPL DL<=0.05 MIU/L-ACNC: 0.54 UIU/ML (ref 0.27–4.2)
VIT B12 BLD-MCNC: 310 PG/ML (ref 211–946)
WBC NRBC COR # BLD AUTO: 10.78 10*3/MM3 (ref 3.4–10.8)

## 2024-02-21 DIAGNOSIS — E55.9 VITAMIN D DEFICIENCY: Primary | ICD-10-CM

## 2024-02-21 DIAGNOSIS — E53.8 FOLIC ACID DEFICIENCY: ICD-10-CM

## 2024-02-21 RX ORDER — ERGOCALCIFEROL 1.25 MG/1
50000 CAPSULE ORAL WEEKLY
Qty: 5 CAPSULE | Refills: 2 | Status: SHIPPED | OUTPATIENT
Start: 2024-02-21

## 2024-02-21 RX ORDER — FOLIC ACID 1 MG/1
1 TABLET ORAL DAILY
Qty: 30 TABLET | Refills: 5 | Status: SHIPPED | OUTPATIENT
Start: 2024-02-21

## 2024-02-22 LAB
BACTERIA SPEC AEROBE CULT: NO GROWTH
C TRACH RRNA CVX QL NAA+PROBE: NEGATIVE
CYTOLOGIST CVX/VAG CYTO: NORMAL
CYTOLOGY CVX/VAG DOC CYTO: NORMAL
CYTOLOGY CVX/VAG DOC THIN PREP: NORMAL
DX ICD CODE: NORMAL
HIV 1 & 2 AB SER-IMP: NORMAL
HPV GENOTYPE REFLEX: NORMAL
HPV I/H RISK 4 DNA CVX QL PROBE+SIG AMP: NEGATIVE
N GONORRHOEA RRNA CVX QL NAA+PROBE: NEGATIVE
OTHER STN SPEC: NORMAL
STAT OF ADQ CVX/VAG CYTO-IMP: NORMAL

## 2024-03-07 ENCOUNTER — HOSPITAL ENCOUNTER (OUTPATIENT)
Dept: MAMMOGRAPHY | Facility: HOSPITAL | Age: 29
Discharge: HOME | End: 2024-03-07
Payer: COMMERCIAL

## 2024-03-07 ENCOUNTER — HOSPITAL ENCOUNTER (OUTPATIENT)
Dept: ULTRASOUND IMAGING | Facility: HOSPITAL | Age: 29
Discharge: HOME | End: 2024-03-07
Payer: COMMERCIAL

## 2024-03-07 DIAGNOSIS — N64.4 BREAST TENDERNESS IN FEMALE: ICD-10-CM

## 2024-03-07 PROCEDURE — G0279 TOMOSYNTHESIS, MAMMO: HCPCS

## 2024-03-07 PROCEDURE — 77066 DX MAMMO INCL CAD BI: CPT

## 2024-03-22 ENCOUNTER — TELEMEDICINE (OUTPATIENT)
Dept: FAMILY MEDICINE CLINIC | Facility: TELEHEALTH | Age: 29
End: 2024-03-22
Payer: COMMERCIAL

## 2024-03-22 VITALS — HEART RATE: 89 BPM | TEMPERATURE: 98.4 F

## 2024-03-22 DIAGNOSIS — H66.91 RIGHT ACUTE OTITIS MEDIA: Primary | ICD-10-CM

## 2024-03-22 PROCEDURE — 99213 OFFICE O/P EST LOW 20 MIN: CPT | Performed by: NURSE PRACTITIONER

## 2024-03-22 RX ORDER — CEFDINIR 300 MG/1
300 CAPSULE ORAL 2 TIMES DAILY
Qty: 14 CAPSULE | Refills: 0 | Status: SHIPPED | OUTPATIENT
Start: 2024-03-22 | End: 2024-03-29

## 2024-03-22 RX ORDER — PHENTERMINE HYDROCHLORIDE 37.5 MG/1
37.5 TABLET ORAL EVERY MORNING
COMMUNITY
Start: 2024-03-16

## 2024-03-22 RX ORDER — PREDNISONE 20 MG/1
20 TABLET ORAL 2 TIMES DAILY
Qty: 10 TABLET | Refills: 0 | Status: SHIPPED | OUTPATIENT
Start: 2024-03-22 | End: 2024-03-27

## 2024-03-22 RX ORDER — FLUCONAZOLE 150 MG/1
150 TABLET ORAL
Qty: 2 TABLET | Refills: 0 | Status: SHIPPED | OUTPATIENT
Start: 2024-03-22

## 2024-03-22 RX ORDER — AZITHROMYCIN 250 MG/1
250 TABLET, FILM COATED ORAL DAILY
COMMUNITY
Start: 2024-03-19

## 2024-04-01 ENCOUNTER — OFFICE VISIT (OUTPATIENT)
Dept: PSYCHIATRY | Facility: CLINIC | Age: 29
End: 2024-04-01
Payer: COMMERCIAL

## 2024-04-01 ENCOUNTER — LAB (OUTPATIENT)
Dept: LAB | Facility: HOSPITAL | Age: 29
End: 2024-04-01
Payer: COMMERCIAL

## 2024-04-01 VITALS
WEIGHT: 174 LBS | HEART RATE: 79 BPM | SYSTOLIC BLOOD PRESSURE: 117 MMHG | BODY MASS INDEX: 27.31 KG/M2 | HEIGHT: 67 IN | OXYGEN SATURATION: 100 % | DIASTOLIC BLOOD PRESSURE: 75 MMHG

## 2024-04-01 DIAGNOSIS — F39 MOOD DISORDER: Primary | ICD-10-CM

## 2024-04-01 DIAGNOSIS — F90.9 ADULT ADHD: ICD-10-CM

## 2024-04-01 LAB
AMPHET+METHAMPHET UR QL: NEGATIVE
BARBITURATES UR QL SCN: NEGATIVE
BENZODIAZ UR QL SCN: NEGATIVE
CANNABINOIDS SERPL QL: NEGATIVE
COCAINE UR QL: NEGATIVE
FENTANYL UR-MCNC: NEGATIVE NG/ML
METHADONE UR QL SCN: NEGATIVE
OPIATES UR QL: NEGATIVE
OXYCODONE UR QL SCN: NEGATIVE
T3FREE SERPL-MCNC: 3.02 PG/ML (ref 2–4.4)

## 2024-04-01 PROCEDURE — 80307 DRUG TEST PRSMV CHEM ANLYZR: CPT

## 2024-04-01 PROCEDURE — 84481 FREE ASSAY (FT-3): CPT | Performed by: NURSE PRACTITIONER

## 2024-04-03 ENCOUNTER — TELEPHONE (OUTPATIENT)
Dept: PSYCHIATRY | Facility: CLINIC | Age: 29
End: 2024-04-03
Payer: COMMERCIAL

## 2024-04-03 DIAGNOSIS — F90.9 ADULT ADHD: Primary | ICD-10-CM

## 2024-04-03 RX ORDER — DEXTROAMPHETAMINE SACCHARATE, AMPHETAMINE ASPARTATE MONOHYDRATE, DEXTROAMPHETAMINE SULFATE AND AMPHETAMINE SULFATE 2.5; 2.5; 2.5; 2.5 MG/1; MG/1; MG/1; MG/1
10 CAPSULE, EXTENDED RELEASE ORAL DAILY
Qty: 14 CAPSULE | Refills: 0 | Status: CANCELLED | OUTPATIENT
Start: 2024-04-03 | End: 2024-04-17

## 2024-04-04 DIAGNOSIS — F90.9 ADULT ADHD: Primary | ICD-10-CM

## 2024-04-04 RX ORDER — DEXTROAMPHETAMINE SACCHARATE, AMPHETAMINE ASPARTATE MONOHYDRATE, DEXTROAMPHETAMINE SULFATE AND AMPHETAMINE SULFATE 2.5; 2.5; 2.5; 2.5 MG/1; MG/1; MG/1; MG/1
10 CAPSULE, EXTENDED RELEASE ORAL DAILY
Qty: 30 CAPSULE | Refills: 0 | Status: SHIPPED | OUTPATIENT
Start: 2024-04-04 | End: 2024-05-04

## 2024-04-05 ENCOUNTER — PATIENT ROUNDING (BHMG ONLY) (OUTPATIENT)
Dept: PSYCHIATRY | Facility: CLINIC | Age: 29
End: 2024-04-05
Payer: COMMERCIAL

## 2024-04-26 ENCOUNTER — TELEPHONE (OUTPATIENT)
Dept: PSYCHIATRY | Facility: CLINIC | Age: 29
End: 2024-04-26
Payer: COMMERCIAL

## 2024-04-26 DIAGNOSIS — F90.9 ADULT ADHD: ICD-10-CM

## 2024-04-29 RX ORDER — DEXTROAMPHETAMINE SACCHARATE, AMPHETAMINE ASPARTATE MONOHYDRATE, DEXTROAMPHETAMINE SULFATE AND AMPHETAMINE SULFATE 2.5; 2.5; 2.5; 2.5 MG/1; MG/1; MG/1; MG/1
10 CAPSULE, EXTENDED RELEASE ORAL DAILY
Qty: 30 CAPSULE | Refills: 0 | Status: CANCELLED | OUTPATIENT
Start: 2024-04-29 | End: 2024-05-29

## 2024-05-03 DIAGNOSIS — F90.9 ADULT ADHD: ICD-10-CM

## 2024-05-03 RX ORDER — DEXTROAMPHETAMINE SACCHARATE, AMPHETAMINE ASPARTATE MONOHYDRATE, DEXTROAMPHETAMINE SULFATE AND AMPHETAMINE SULFATE 2.5; 2.5; 2.5; 2.5 MG/1; MG/1; MG/1; MG/1
10 CAPSULE, EXTENDED RELEASE ORAL DAILY
Qty: 30 CAPSULE | Refills: 0 | OUTPATIENT
Start: 2024-05-03 | End: 2024-06-02

## 2024-05-06 ENCOUNTER — OFFICE VISIT (OUTPATIENT)
Dept: PSYCHIATRY | Facility: CLINIC | Age: 29
End: 2024-05-06
Payer: COMMERCIAL

## 2024-05-06 VITALS
WEIGHT: 179 LBS | BODY MASS INDEX: 28.09 KG/M2 | SYSTOLIC BLOOD PRESSURE: 140 MMHG | HEART RATE: 89 BPM | DIASTOLIC BLOOD PRESSURE: 83 MMHG | HEIGHT: 67 IN

## 2024-05-06 DIAGNOSIS — F39 MOOD DISORDER: ICD-10-CM

## 2024-05-06 DIAGNOSIS — F90.9 ADULT ADHD: Primary | ICD-10-CM

## 2024-05-06 PROCEDURE — 99214 OFFICE O/P EST MOD 30 MIN: CPT

## 2024-05-06 RX ORDER — DEXTROAMPHETAMINE SACCHARATE, AMPHETAMINE ASPARTATE MONOHYDRATE, DEXTROAMPHETAMINE SULFATE AND AMPHETAMINE SULFATE 2.5; 2.5; 2.5; 2.5 MG/1; MG/1; MG/1; MG/1
10 CAPSULE, EXTENDED RELEASE ORAL DAILY
Qty: 30 CAPSULE | Refills: 0 | Status: SHIPPED | OUTPATIENT
Start: 2024-05-06 | End: 2024-06-05

## 2024-05-06 RX ORDER — DEXTROAMPHETAMINE SACCHARATE, AMPHETAMINE ASPARTATE MONOHYDRATE, DEXTROAMPHETAMINE SULFATE AND AMPHETAMINE SULFATE 5; 5; 5; 5 MG/1; MG/1; MG/1; MG/1
20 CAPSULE, EXTENDED RELEASE ORAL DAILY
Qty: 30 CAPSULE | Refills: 0 | Status: CANCELLED | OUTPATIENT
Start: 2024-05-06 | End: 2024-06-05

## 2024-05-13 ENCOUNTER — OFFICE VISIT (OUTPATIENT)
Dept: OTOLARYNGOLOGY | Facility: CLINIC | Age: 29
End: 2024-05-13
Payer: COMMERCIAL

## 2024-05-13 ENCOUNTER — TELEPHONE (OUTPATIENT)
Dept: PSYCHIATRY | Facility: CLINIC | Age: 29
End: 2024-05-13
Payer: COMMERCIAL

## 2024-05-13 ENCOUNTER — PROCEDURE VISIT (OUTPATIENT)
Dept: OTOLARYNGOLOGY | Facility: CLINIC | Age: 29
End: 2024-05-13
Payer: COMMERCIAL

## 2024-05-13 VITALS
HEART RATE: 89 BPM | OXYGEN SATURATION: 98 % | SYSTOLIC BLOOD PRESSURE: 124 MMHG | TEMPERATURE: 97.6 F | DIASTOLIC BLOOD PRESSURE: 82 MMHG

## 2024-05-13 DIAGNOSIS — J34.2 DEVIATED NASAL SEPTUM: ICD-10-CM

## 2024-05-13 DIAGNOSIS — M79.18 MYOFASCIAL MUSCLE PAIN: ICD-10-CM

## 2024-05-13 DIAGNOSIS — H92.03 OTALGIA OF BOTH EARS: Primary | ICD-10-CM

## 2024-05-13 DIAGNOSIS — F90.9 ADULT ADHD: Primary | ICD-10-CM

## 2024-05-13 PROCEDURE — 99204 OFFICE O/P NEW MOD 45 MIN: CPT | Performed by: OTOLARYNGOLOGY

## 2024-05-13 PROCEDURE — 92567 TYMPANOMETRY: CPT | Performed by: AUDIOLOGIST

## 2024-05-13 PROCEDURE — 92557 COMPREHENSIVE HEARING TEST: CPT | Performed by: AUDIOLOGIST

## 2024-05-13 RX ORDER — CYCLOBENZAPRINE HCL 10 MG
10 TABLET ORAL
Qty: 14 TABLET | Refills: 2 | Status: SHIPPED | OUTPATIENT
Start: 2024-05-13 | End: 2024-05-27

## 2024-05-16 RX ORDER — DEXTROAMPHETAMINE SACCHARATE, AMPHETAMINE ASPARTATE, DEXTROAMPHETAMINE SULFATE AND AMPHETAMINE SULFATE 2.5; 2.5; 2.5; 2.5 MG/1; MG/1; MG/1; MG/1
10 TABLET ORAL DAILY
Qty: 14 TABLET | Refills: 0 | Status: SHIPPED | OUTPATIENT
Start: 2024-05-16 | End: 2024-05-30

## 2024-05-17 ENCOUNTER — TELEPHONE (OUTPATIENT)
Dept: PSYCHIATRY | Facility: CLINIC | Age: 29
End: 2024-05-17
Payer: COMMERCIAL

## 2024-05-28 DIAGNOSIS — F90.9 ADULT ADHD: ICD-10-CM

## 2024-05-28 RX ORDER — DEXTROAMPHETAMINE SACCHARATE, AMPHETAMINE ASPARTATE, DEXTROAMPHETAMINE SULFATE AND AMPHETAMINE SULFATE 2.5; 2.5; 2.5; 2.5 MG/1; MG/1; MG/1; MG/1
10 TABLET ORAL DAILY
Qty: 30 TABLET | Refills: 0 | Status: SHIPPED | OUTPATIENT
Start: 2024-05-30 | End: 2024-06-29

## 2024-05-29 ENCOUNTER — OFFICE VISIT (OUTPATIENT)
Dept: FAMILY MEDICINE CLINIC | Facility: CLINIC | Age: 29
End: 2024-05-29
Payer: COMMERCIAL

## 2024-05-29 VITALS
WEIGHT: 178 LBS | HEIGHT: 67 IN | BODY MASS INDEX: 27.94 KG/M2 | HEART RATE: 100 BPM | OXYGEN SATURATION: 97 % | TEMPERATURE: 97.9 F

## 2024-05-29 DIAGNOSIS — J01.90 ACUTE SINUSITIS, RECURRENCE NOT SPECIFIED, UNSPECIFIED LOCATION: Primary | ICD-10-CM

## 2024-05-29 LAB
EXPIRATION DATE: NORMAL
FLUAV AG UPPER RESP QL IA.RAPID: NOT DETECTED
FLUBV AG UPPER RESP QL IA.RAPID: NOT DETECTED
INTERNAL CONTROL: NORMAL
Lab: NORMAL
SARS-COV-2 AG UPPER RESP QL IA.RAPID: NOT DETECTED

## 2024-05-29 PROCEDURE — 87428 SARSCOV & INF VIR A&B AG IA: CPT | Performed by: FAMILY MEDICINE

## 2024-05-29 PROCEDURE — 99213 OFFICE O/P EST LOW 20 MIN: CPT | Performed by: FAMILY MEDICINE

## 2024-05-29 RX ORDER — DOXYCYCLINE HYCLATE 100 MG/1
100 CAPSULE ORAL 2 TIMES DAILY
Qty: 10 CAPSULE | Refills: 0 | Status: SHIPPED | OUTPATIENT
Start: 2024-05-29 | End: 2024-06-03

## 2024-05-29 RX ORDER — FLUTICASONE PROPIONATE 50 MCG
2 SPRAY, SUSPENSION (ML) NASAL DAILY
Qty: 11.1 ML | Refills: 0 | Status: SHIPPED | OUTPATIENT
Start: 2024-05-29

## 2024-06-03 DIAGNOSIS — F90.9 ADULT ADHD: Primary | ICD-10-CM

## 2024-06-03 RX ORDER — FLUCONAZOLE 150 MG/1
150 TABLET ORAL ONCE
Qty: 1 TABLET | Refills: 0 | Status: SHIPPED | OUTPATIENT
Start: 2024-06-03 | End: 2024-06-03

## 2024-06-06 RX ORDER — DEXTROAMPHETAMINE SACCHARATE, AMPHETAMINE ASPARTATE MONOHYDRATE, DEXTROAMPHETAMINE SULFATE AND AMPHETAMINE SULFATE 5; 5; 5; 5 MG/1; MG/1; MG/1; MG/1
20 CAPSULE, EXTENDED RELEASE ORAL EVERY MORNING
Qty: 30 CAPSULE | Refills: 0 | Status: SHIPPED | OUTPATIENT
Start: 2024-06-06 | End: 2024-07-06

## 2024-06-21 DIAGNOSIS — F90.9 ADULT ADHD: ICD-10-CM

## 2024-06-21 RX ORDER — DEXTROAMPHETAMINE SACCHARATE, AMPHETAMINE ASPARTATE, DEXTROAMPHETAMINE SULFATE AND AMPHETAMINE SULFATE 2.5; 2.5; 2.5; 2.5 MG/1; MG/1; MG/1; MG/1
10 TABLET ORAL DAILY
Qty: 30 TABLET | Refills: 0 | Status: SHIPPED | OUTPATIENT
Start: 2024-06-29 | End: 2024-07-29

## 2024-06-25 ENCOUNTER — OFFICE VISIT (OUTPATIENT)
Dept: FAMILY MEDICINE CLINIC | Facility: CLINIC | Age: 29
End: 2024-06-25

## 2024-06-25 VITALS
OXYGEN SATURATION: 96 % | HEART RATE: 72 BPM | HEIGHT: 67 IN | TEMPERATURE: 97.3 F | DIASTOLIC BLOOD PRESSURE: 72 MMHG | SYSTOLIC BLOOD PRESSURE: 126 MMHG | BODY MASS INDEX: 27 KG/M2 | WEIGHT: 172 LBS

## 2024-06-25 DIAGNOSIS — G43.109 MIGRAINE WITH AURA AND WITHOUT STATUS MIGRAINOSUS, NOT INTRACTABLE: ICD-10-CM

## 2024-06-25 DIAGNOSIS — E66.3 OVERWEIGHT (BMI 25.0-29.9): ICD-10-CM

## 2024-06-25 DIAGNOSIS — Z00.00 WELL ADULT EXAM: Primary | ICD-10-CM

## 2024-06-25 PROCEDURE — 99395 PREV VISIT EST AGE 18-39: CPT | Performed by: NURSE PRACTITIONER

## 2024-06-25 RX ORDER — GALCANEZUMAB 120 MG/ML
120 INJECTION, SOLUTION SUBCUTANEOUS
Qty: 1.12 ML | Refills: 5 | Status: SHIPPED | OUTPATIENT
Start: 2024-06-25

## 2024-06-26 ENCOUNTER — TELEPHONE (OUTPATIENT)
Dept: FAMILY MEDICINE CLINIC | Facility: CLINIC | Age: 29
End: 2024-06-26
Payer: COMMERCIAL

## 2024-06-29 DIAGNOSIS — F90.9 ADULT ADHD: ICD-10-CM

## 2024-07-01 RX ORDER — DEXTROAMPHETAMINE SACCHARATE, AMPHETAMINE ASPARTATE MONOHYDRATE, DEXTROAMPHETAMINE SULFATE AND AMPHETAMINE SULFATE 5; 5; 5; 5 MG/1; MG/1; MG/1; MG/1
20 CAPSULE, EXTENDED RELEASE ORAL EVERY MORNING
Qty: 30 CAPSULE | Refills: 0 | Status: SHIPPED | OUTPATIENT
Start: 2024-07-06 | End: 2024-08-05

## 2024-07-02 ENCOUNTER — OFFICE VISIT (OUTPATIENT)
Dept: FAMILY MEDICINE CLINIC | Facility: CLINIC | Age: 29
End: 2024-07-02
Payer: COMMERCIAL

## 2024-07-02 VITALS
HEIGHT: 67 IN | HEART RATE: 104 BPM | DIASTOLIC BLOOD PRESSURE: 82 MMHG | SYSTOLIC BLOOD PRESSURE: 114 MMHG | OXYGEN SATURATION: 98 % | WEIGHT: 170.7 LBS | BODY MASS INDEX: 26.79 KG/M2 | TEMPERATURE: 97.8 F

## 2024-07-02 DIAGNOSIS — J06.9 ACUTE URI: Primary | ICD-10-CM

## 2024-07-02 PROCEDURE — 99213 OFFICE O/P EST LOW 20 MIN: CPT | Performed by: FAMILY MEDICINE

## 2024-07-02 PROCEDURE — 1126F AMNT PAIN NOTED NONE PRSNT: CPT | Performed by: FAMILY MEDICINE

## 2024-07-22 DIAGNOSIS — F90.9 ADULT ADHD: ICD-10-CM

## 2024-07-25 ENCOUNTER — OFFICE VISIT (OUTPATIENT)
Dept: FAMILY MEDICINE CLINIC | Facility: CLINIC | Age: 29
End: 2024-07-25
Payer: COMMERCIAL

## 2024-07-25 VITALS
TEMPERATURE: 98.2 F | DIASTOLIC BLOOD PRESSURE: 72 MMHG | HEIGHT: 67 IN | BODY MASS INDEX: 25.74 KG/M2 | OXYGEN SATURATION: 98 % | HEART RATE: 90 BPM | SYSTOLIC BLOOD PRESSURE: 118 MMHG | WEIGHT: 164 LBS

## 2024-07-25 DIAGNOSIS — J02.9 SORE THROAT: Primary | ICD-10-CM

## 2024-07-25 DIAGNOSIS — J06.9 ACUTE URI: ICD-10-CM

## 2024-07-25 LAB
EXPIRATION DATE: NORMAL
INTERNAL CONTROL: NORMAL
Lab: NORMAL
S PYO AG THROAT QL: NEGATIVE

## 2024-07-25 PROCEDURE — 99213 OFFICE O/P EST LOW 20 MIN: CPT | Performed by: FAMILY MEDICINE

## 2024-07-25 PROCEDURE — 1125F AMNT PAIN NOTED PAIN PRSNT: CPT | Performed by: FAMILY MEDICINE

## 2024-07-25 PROCEDURE — 1159F MED LIST DOCD IN RCRD: CPT | Performed by: FAMILY MEDICINE

## 2024-07-25 PROCEDURE — 1160F RVW MEDS BY RX/DR IN RCRD: CPT | Performed by: FAMILY MEDICINE

## 2024-07-25 PROCEDURE — 87880 STREP A ASSAY W/OPTIC: CPT | Performed by: FAMILY MEDICINE

## 2024-07-26 ENCOUNTER — TELEPHONE (OUTPATIENT)
Dept: PSYCHIATRY | Facility: CLINIC | Age: 29
End: 2024-07-26
Payer: COMMERCIAL

## 2024-07-26 RX ORDER — DEXTROAMPHETAMINE SACCHARATE, AMPHETAMINE ASPARTATE, DEXTROAMPHETAMINE SULFATE AND AMPHETAMINE SULFATE 2.5; 2.5; 2.5; 2.5 MG/1; MG/1; MG/1; MG/1
10 TABLET ORAL DAILY
Qty: 30 TABLET | Refills: 0 | Status: SHIPPED | OUTPATIENT
Start: 2024-07-29 | End: 2024-08-28

## 2024-07-26 RX ORDER — DEXTROAMPHETAMINE SACCHARATE, AMPHETAMINE ASPARTATE MONOHYDRATE, DEXTROAMPHETAMINE SULFATE AND AMPHETAMINE SULFATE 5; 5; 5; 5 MG/1; MG/1; MG/1; MG/1
20 CAPSULE, EXTENDED RELEASE ORAL EVERY MORNING
Qty: 30 CAPSULE | Refills: 0 | Status: SHIPPED | OUTPATIENT
Start: 2024-08-05 | End: 2024-09-04

## 2024-07-29 ENCOUNTER — OFFICE VISIT (OUTPATIENT)
Dept: FAMILY MEDICINE CLINIC | Facility: CLINIC | Age: 29
End: 2024-07-29
Payer: COMMERCIAL

## 2024-07-29 VITALS
HEART RATE: 101 BPM | DIASTOLIC BLOOD PRESSURE: 76 MMHG | SYSTOLIC BLOOD PRESSURE: 122 MMHG | WEIGHT: 166 LBS | BODY MASS INDEX: 26.06 KG/M2 | HEIGHT: 67 IN | TEMPERATURE: 97.3 F | OXYGEN SATURATION: 97 %

## 2024-07-29 DIAGNOSIS — E66.3 OVERWEIGHT (BMI 25.0-29.9): Primary | ICD-10-CM

## 2024-07-29 PROCEDURE — 1159F MED LIST DOCD IN RCRD: CPT | Performed by: NURSE PRACTITIONER

## 2024-07-29 PROCEDURE — 1160F RVW MEDS BY RX/DR IN RCRD: CPT | Performed by: NURSE PRACTITIONER

## 2024-07-29 PROCEDURE — 1126F AMNT PAIN NOTED NONE PRSNT: CPT | Performed by: NURSE PRACTITIONER

## 2024-07-29 PROCEDURE — 99213 OFFICE O/P EST LOW 20 MIN: CPT | Performed by: NURSE PRACTITIONER

## 2024-07-30 ENCOUNTER — OFFICE VISIT (OUTPATIENT)
Dept: PSYCHIATRY | Facility: CLINIC | Age: 29
End: 2024-07-30
Payer: COMMERCIAL

## 2024-07-30 VITALS
HEIGHT: 66 IN | SYSTOLIC BLOOD PRESSURE: 122 MMHG | DIASTOLIC BLOOD PRESSURE: 79 MMHG | BODY MASS INDEX: 26.71 KG/M2 | WEIGHT: 166.2 LBS | HEART RATE: 102 BPM

## 2024-07-30 DIAGNOSIS — F39 MOOD DISORDER: ICD-10-CM

## 2024-07-30 DIAGNOSIS — F51.04 INSOMNIA, PSYCHOPHYSIOLOGICAL: Primary | ICD-10-CM

## 2024-07-30 DIAGNOSIS — F90.9 ADULT ADHD: ICD-10-CM

## 2024-07-30 PROCEDURE — 1160F RVW MEDS BY RX/DR IN RCRD: CPT

## 2024-07-30 PROCEDURE — 1159F MED LIST DOCD IN RCRD: CPT

## 2024-07-30 PROCEDURE — 99214 OFFICE O/P EST MOD 30 MIN: CPT

## 2024-07-30 RX ORDER — TRAZODONE HYDROCHLORIDE 50 MG/1
50 TABLET ORAL NIGHTLY
Qty: 30 TABLET | Refills: 2 | Status: SHIPPED | OUTPATIENT
Start: 2024-07-30 | End: 2024-10-28

## 2024-08-19 DIAGNOSIS — F90.9 ADULT ADHD: ICD-10-CM

## 2024-08-19 RX ORDER — DEXTROAMPHETAMINE SACCHARATE, AMPHETAMINE ASPARTATE, DEXTROAMPHETAMINE SULFATE AND AMPHETAMINE SULFATE 2.5; 2.5; 2.5; 2.5 MG/1; MG/1; MG/1; MG/1
10 TABLET ORAL DAILY
Qty: 30 TABLET | Refills: 0 | Status: SHIPPED | OUTPATIENT
Start: 2024-08-28 | End: 2024-09-27

## 2024-08-19 RX ORDER — DEXTROAMPHETAMINE SACCHARATE, AMPHETAMINE ASPARTATE MONOHYDRATE, DEXTROAMPHETAMINE SULFATE AND AMPHETAMINE SULFATE 5; 5; 5; 5 MG/1; MG/1; MG/1; MG/1
20 CAPSULE, EXTENDED RELEASE ORAL EVERY MORNING
Qty: 30 CAPSULE | Refills: 0 | Status: SHIPPED | OUTPATIENT
Start: 2024-09-04 | End: 2024-10-04

## 2024-08-23 DIAGNOSIS — E53.8 FOLIC ACID DEFICIENCY: ICD-10-CM

## 2024-08-23 RX ORDER — FOLIC ACID 1 MG/1
1000 TABLET ORAL DAILY
Qty: 30 TABLET | Refills: 0 | Status: SHIPPED | OUTPATIENT
Start: 2024-08-23

## 2024-08-26 ENCOUNTER — OFFICE VISIT (OUTPATIENT)
Dept: FAMILY MEDICINE CLINIC | Facility: CLINIC | Age: 29
End: 2024-08-26
Payer: COMMERCIAL

## 2024-08-26 ENCOUNTER — TELEPHONE (OUTPATIENT)
Dept: PSYCHIATRY | Facility: CLINIC | Age: 29
End: 2024-08-26
Payer: COMMERCIAL

## 2024-08-26 VITALS
TEMPERATURE: 97.1 F | DIASTOLIC BLOOD PRESSURE: 70 MMHG | OXYGEN SATURATION: 99 % | BODY MASS INDEX: 25.88 KG/M2 | HEART RATE: 115 BPM | SYSTOLIC BLOOD PRESSURE: 122 MMHG | WEIGHT: 161 LBS | HEIGHT: 66 IN

## 2024-08-26 DIAGNOSIS — B37.9 YEAST INFECTION: ICD-10-CM

## 2024-08-26 DIAGNOSIS — E66.3 OVERWEIGHT (BMI 25.0-29.9): Primary | ICD-10-CM

## 2024-08-26 PROCEDURE — 1159F MED LIST DOCD IN RCRD: CPT | Performed by: NURSE PRACTITIONER

## 2024-08-26 PROCEDURE — 1126F AMNT PAIN NOTED NONE PRSNT: CPT | Performed by: NURSE PRACTITIONER

## 2024-08-26 PROCEDURE — 99214 OFFICE O/P EST MOD 30 MIN: CPT | Performed by: NURSE PRACTITIONER

## 2024-08-26 PROCEDURE — 1160F RVW MEDS BY RX/DR IN RCRD: CPT | Performed by: NURSE PRACTITIONER

## 2024-08-26 RX ORDER — FLUCONAZOLE 150 MG/1
150 TABLET ORAL ONCE
Qty: 1 TABLET | Refills: 0 | Status: SHIPPED | OUTPATIENT
Start: 2024-08-26 | End: 2024-08-26

## 2024-08-27 RX ORDER — FLUTICASONE PROPIONATE 50 MCG
2 SPRAY, SUSPENSION (ML) NASAL DAILY
Qty: 11.1 ML | Refills: 0 | Status: SHIPPED | OUTPATIENT
Start: 2024-08-27

## 2024-09-06 ENCOUNTER — PATIENT MESSAGE (OUTPATIENT)
Dept: FAMILY MEDICINE CLINIC | Facility: CLINIC | Age: 29
End: 2024-09-06
Payer: COMMERCIAL

## 2024-09-06 DIAGNOSIS — B96.89 BV (BACTERIAL VAGINOSIS): Primary | ICD-10-CM

## 2024-09-06 DIAGNOSIS — N76.0 BV (BACTERIAL VAGINOSIS): Primary | ICD-10-CM

## 2024-09-06 RX ORDER — METRONIDAZOLE 500 MG/1
500 TABLET ORAL 2 TIMES DAILY
Qty: 14 TABLET | Refills: 0 | Status: SHIPPED | OUTPATIENT
Start: 2024-09-06 | End: 2024-09-13

## 2024-09-17 DIAGNOSIS — F90.9 ADULT ADHD: ICD-10-CM

## 2024-09-17 RX ORDER — DEXTROAMPHETAMINE SACCHARATE, AMPHETAMINE ASPARTATE, DEXTROAMPHETAMINE SULFATE AND AMPHETAMINE SULFATE 2.5; 2.5; 2.5; 2.5 MG/1; MG/1; MG/1; MG/1
10 TABLET ORAL DAILY
Qty: 30 TABLET | Refills: 0 | Status: SHIPPED | OUTPATIENT
Start: 2024-09-27 | End: 2024-10-27

## 2024-09-17 RX ORDER — DEXTROAMPHETAMINE SACCHARATE, AMPHETAMINE ASPARTATE MONOHYDRATE, DEXTROAMPHETAMINE SULFATE AND AMPHETAMINE SULFATE 5; 5; 5; 5 MG/1; MG/1; MG/1; MG/1
20 CAPSULE, EXTENDED RELEASE ORAL EVERY MORNING
Qty: 30 CAPSULE | Refills: 0 | Status: SHIPPED | OUTPATIENT
Start: 2024-10-03 | End: 2024-11-02

## 2024-09-22 DIAGNOSIS — E53.8 FOLIC ACID DEFICIENCY: ICD-10-CM

## 2024-09-23 ENCOUNTER — OFFICE VISIT (OUTPATIENT)
Dept: FAMILY MEDICINE CLINIC | Facility: CLINIC | Age: 29
End: 2024-09-23
Payer: COMMERCIAL

## 2024-09-23 VITALS
DIASTOLIC BLOOD PRESSURE: 72 MMHG | WEIGHT: 157 LBS | SYSTOLIC BLOOD PRESSURE: 124 MMHG | TEMPERATURE: 97.6 F | OXYGEN SATURATION: 100 % | HEART RATE: 100 BPM | BODY MASS INDEX: 25.23 KG/M2 | HEIGHT: 66 IN

## 2024-09-23 DIAGNOSIS — R53.83 FATIGUE, UNSPECIFIED TYPE: ICD-10-CM

## 2024-09-23 DIAGNOSIS — Z23 INFLUENZA VACCINATION ADMINISTERED AT CURRENT VISIT: ICD-10-CM

## 2024-09-23 DIAGNOSIS — E66.3 OVERWEIGHT (BMI 25.0-29.9): Primary | ICD-10-CM

## 2024-09-23 DIAGNOSIS — E55.9 VITAMIN D DEFICIENCY: ICD-10-CM

## 2024-09-23 DIAGNOSIS — E53.8 FOLIC ACID DEFICIENCY: ICD-10-CM

## 2024-09-23 LAB
25(OH)D3 SERPL-MCNC: 25.3 NG/ML (ref 30–100)
ALBUMIN SERPL-MCNC: 4.2 G/DL (ref 3.5–5.2)
ALBUMIN/GLOB SERPL: 1.4 G/DL
ALP SERPL-CCNC: 52 U/L (ref 39–117)
ALT SERPL W P-5'-P-CCNC: 15 U/L (ref 1–33)
ANION GAP SERPL CALCULATED.3IONS-SCNC: 12.4 MMOL/L (ref 5–15)
AST SERPL-CCNC: 16 U/L (ref 1–32)
BILIRUB SERPL-MCNC: 0.6 MG/DL (ref 0–1.2)
BUN SERPL-MCNC: 12 MG/DL (ref 6–20)
BUN/CREAT SERPL: 13.2 (ref 7–25)
CALCIUM SPEC-SCNC: 9.5 MG/DL (ref 8.6–10.5)
CHLORIDE SERPL-SCNC: 104 MMOL/L (ref 98–107)
CO2 SERPL-SCNC: 22.6 MMOL/L (ref 22–29)
CREAT SERPL-MCNC: 0.91 MG/DL (ref 0.57–1)
DEPRECATED RDW RBC AUTO: 39.7 FL (ref 37–54)
EGFRCR SERPLBLD CKD-EPI 2021: 86.7 ML/MIN/1.73
ERYTHROCYTE [DISTWIDTH] IN BLOOD BY AUTOMATED COUNT: 11.5 % (ref 12.3–15.4)
FOLATE SERPL-MCNC: 6.21 NG/ML (ref 4.78–24.2)
GLOBULIN UR ELPH-MCNC: 2.9 GM/DL
GLUCOSE SERPL-MCNC: 86 MG/DL (ref 65–99)
HCT VFR BLD AUTO: 45.2 % (ref 34–46.6)
HGB BLD-MCNC: 14.5 G/DL (ref 12–15.9)
MCH RBC QN AUTO: 30.5 PG (ref 26.6–33)
MCHC RBC AUTO-ENTMCNC: 32.1 G/DL (ref 31.5–35.7)
MCV RBC AUTO: 95 FL (ref 79–97)
PLATELET # BLD AUTO: 297 10*3/MM3 (ref 140–450)
PMV BLD AUTO: 10.5 FL (ref 6–12)
POTASSIUM SERPL-SCNC: 3.4 MMOL/L (ref 3.5–5.2)
PROT SERPL-MCNC: 7.1 G/DL (ref 6–8.5)
RBC # BLD AUTO: 4.76 10*6/MM3 (ref 3.77–5.28)
SODIUM SERPL-SCNC: 139 MMOL/L (ref 136–145)
TSH SERPL DL<=0.05 MIU/L-ACNC: 0.22 UIU/ML (ref 0.27–4.2)
VIT B12 BLD-MCNC: 743 PG/ML (ref 211–946)
WBC NRBC COR # BLD AUTO: 10.66 10*3/MM3 (ref 3.4–10.8)

## 2024-09-23 PROCEDURE — 1159F MED LIST DOCD IN RCRD: CPT | Performed by: NURSE PRACTITIONER

## 2024-09-23 PROCEDURE — 82607 VITAMIN B-12: CPT | Performed by: NURSE PRACTITIONER

## 2024-09-23 PROCEDURE — 90471 IMMUNIZATION ADMIN: CPT | Performed by: NURSE PRACTITIONER

## 2024-09-23 PROCEDURE — 84443 ASSAY THYROID STIM HORMONE: CPT | Performed by: NURSE PRACTITIONER

## 2024-09-23 PROCEDURE — 1160F RVW MEDS BY RX/DR IN RCRD: CPT | Performed by: NURSE PRACTITIONER

## 2024-09-23 PROCEDURE — 80053 COMPREHEN METABOLIC PANEL: CPT | Performed by: NURSE PRACTITIONER

## 2024-09-23 PROCEDURE — 85027 COMPLETE CBC AUTOMATED: CPT | Performed by: NURSE PRACTITIONER

## 2024-09-23 PROCEDURE — 90656 IIV3 VACC NO PRSV 0.5 ML IM: CPT | Performed by: NURSE PRACTITIONER

## 2024-09-23 PROCEDURE — 99214 OFFICE O/P EST MOD 30 MIN: CPT | Performed by: NURSE PRACTITIONER

## 2024-09-23 PROCEDURE — 82306 VITAMIN D 25 HYDROXY: CPT | Performed by: NURSE PRACTITIONER

## 2024-09-23 PROCEDURE — 82746 ASSAY OF FOLIC ACID SERUM: CPT | Performed by: NURSE PRACTITIONER

## 2024-09-23 PROCEDURE — 1126F AMNT PAIN NOTED NONE PRSNT: CPT | Performed by: NURSE PRACTITIONER

## 2024-09-23 RX ORDER — FOLIC ACID 1 MG/1
1000 TABLET ORAL DAILY
Qty: 30 TABLET | Refills: 0 | OUTPATIENT
Start: 2024-09-23

## 2024-09-23 RX ORDER — FOLIC ACID 1 MG/1
1000 TABLET ORAL DAILY
Qty: 30 TABLET | Refills: 5 | Status: SHIPPED | OUTPATIENT
Start: 2024-09-23

## 2024-09-24 DIAGNOSIS — E55.9 VITAMIN D DEFICIENCY: ICD-10-CM

## 2024-09-24 RX ORDER — ERGOCALCIFEROL 1.25 MG/1
50000 CAPSULE, LIQUID FILLED ORAL WEEKLY
Qty: 5 CAPSULE | Refills: 2 | Status: SHIPPED | OUTPATIENT
Start: 2024-09-24

## 2024-09-30 ENCOUNTER — OFFICE VISIT (OUTPATIENT)
Dept: PSYCHIATRY | Facility: CLINIC | Age: 29
End: 2024-09-30
Payer: COMMERCIAL

## 2024-09-30 VITALS
SYSTOLIC BLOOD PRESSURE: 119 MMHG | HEIGHT: 66 IN | HEART RATE: 88 BPM | WEIGHT: 155 LBS | DIASTOLIC BLOOD PRESSURE: 77 MMHG | BODY MASS INDEX: 24.91 KG/M2

## 2024-09-30 DIAGNOSIS — F39 MOOD DISORDER: ICD-10-CM

## 2024-09-30 DIAGNOSIS — F90.9 ADULT ADHD: Primary | ICD-10-CM

## 2024-09-30 DIAGNOSIS — F51.04 INSOMNIA, PSYCHOPHYSIOLOGICAL: ICD-10-CM

## 2024-09-30 PROCEDURE — 99214 OFFICE O/P EST MOD 30 MIN: CPT

## 2024-09-30 PROCEDURE — 1160F RVW MEDS BY RX/DR IN RCRD: CPT

## 2024-09-30 PROCEDURE — 1159F MED LIST DOCD IN RCRD: CPT

## 2024-09-30 RX ORDER — COVID-19 MOLECULAR TEST ASSAY
KIT MISCELLANEOUS
COMMUNITY
Start: 2024-09-27

## 2024-10-15 DIAGNOSIS — F90.9 ADULT ADHD: ICD-10-CM

## 2024-10-16 RX ORDER — DEXTROAMPHETAMINE SACCHARATE, AMPHETAMINE ASPARTATE MONOHYDRATE, DEXTROAMPHETAMINE SULFATE AND AMPHETAMINE SULFATE 5; 5; 5; 5 MG/1; MG/1; MG/1; MG/1
20 CAPSULE, EXTENDED RELEASE ORAL EVERY MORNING
Qty: 30 CAPSULE | Refills: 0 | Status: SHIPPED | OUTPATIENT
Start: 2024-11-02 | End: 2024-12-02

## 2024-10-16 RX ORDER — DEXTROAMPHETAMINE SACCHARATE, AMPHETAMINE ASPARTATE, DEXTROAMPHETAMINE SULFATE AND AMPHETAMINE SULFATE 2.5; 2.5; 2.5; 2.5 MG/1; MG/1; MG/1; MG/1
10 TABLET ORAL DAILY
Qty: 30 TABLET | Refills: 0 | Status: SHIPPED | OUTPATIENT
Start: 2024-10-26 | End: 2024-11-25

## 2024-11-04 DIAGNOSIS — F90.9 ADULT ADHD: ICD-10-CM

## 2024-11-04 RX ORDER — DEXTROAMPHETAMINE SACCHARATE, AMPHETAMINE ASPARTATE MONOHYDRATE, DEXTROAMPHETAMINE SULFATE AND AMPHETAMINE SULFATE 5; 5; 5; 5 MG/1; MG/1; MG/1; MG/1
20 CAPSULE, EXTENDED RELEASE ORAL EVERY MORNING
Qty: 30 CAPSULE | Refills: 0 | OUTPATIENT
Start: 2024-11-04 | End: 2024-12-04

## 2024-11-04 NOTE — TELEPHONE ENCOUNTER
PT NEEDS ADDERALL RESENT TO [facility] DOES NOT HAVE MEDICATION IN STOCK

## 2024-11-04 NOTE — TELEPHONE ENCOUNTER
PT(PATIENT) VERIFIED     PT(PATIENT) STATES HER PHARMACY CALLED AND THEY WILL HAVE IN MEDICATION THIS AFTERNOON     PT(PATIENT) STATES SHE DOES NOT NEED THE SCRIPT RESENT

## 2024-11-14 DIAGNOSIS — F90.9 ADULT ADHD: ICD-10-CM

## 2024-11-14 RX ORDER — DEXTROAMPHETAMINE SACCHARATE, AMPHETAMINE ASPARTATE MONOHYDRATE, DEXTROAMPHETAMINE SULFATE AND AMPHETAMINE SULFATE 5; 5; 5; 5 MG/1; MG/1; MG/1; MG/1
20 CAPSULE, EXTENDED RELEASE ORAL EVERY MORNING
Qty: 30 CAPSULE | Refills: 0 | Status: SHIPPED | OUTPATIENT
Start: 2024-12-03 | End: 2025-01-02

## 2024-11-14 RX ORDER — DEXTROAMPHETAMINE SACCHARATE, AMPHETAMINE ASPARTATE, DEXTROAMPHETAMINE SULFATE AND AMPHETAMINE SULFATE 2.5; 2.5; 2.5; 2.5 MG/1; MG/1; MG/1; MG/1
10 TABLET ORAL DAILY
Qty: 30 TABLET | Refills: 0 | Status: SHIPPED | OUTPATIENT
Start: 2024-11-25 | End: 2024-12-25

## 2024-11-14 NOTE — TELEPHONE ENCOUNTER
CONTROLLED MEDICATION REFILL REQUEST    STATE REGULATION APPT EVERY 3 MONTHS     UDS(URINE DRUG SCREEN) EVERY 6 MONTHS OR UP TO PROVIDER PREFERENCE   (HOPSON 1 PER YEAR)     NEW NARC CONSENT EVERY YEAR      MEDICATION: amphetamine-dextroamphetamine XR (Adderall XR) 20 MG 24 hr capsule (11/02/2024)  amphetamine-dextroamphetamine (Adderall) 10 MG tablet (10/26/2024)    TOO SOON FOR REFILLS      NEXT OFFICE VISIT: Appointment with Olga Jiang APRN (12/16/2024)     LAST OFFICE VISIT: Office Visit with Olga Jiang APRN (09/30/2024)     NARC CONSENT: CONTROLLED SUBSTANCE AGREEMENT - SCAN - CONTROLLED SUBSTANCE AGREEMENT, BEHAVIORAL HEALTH, 04/01/2024 (04/01/2024)     URINE DRUG SCREEN(STANDING ORDER)   (HOPSON 1 PER YEAR): Urine Drug Screen - Urine, Clean Catch (04/01/2024 11:44)     PROVIDER PLEASE ADVISE

## 2024-11-25 ENCOUNTER — TRANSCRIBE ORDERS (OUTPATIENT)
Dept: ADMINISTRATIVE | Facility: HOSPITAL | Age: 29
End: 2024-11-25
Payer: COMMERCIAL

## 2024-11-25 DIAGNOSIS — E04.1 THYROID NODULE: Primary | ICD-10-CM

## 2024-12-01 DIAGNOSIS — E55.9 VITAMIN D DEFICIENCY: ICD-10-CM

## 2024-12-02 RX ORDER — ERGOCALCIFEROL 1.25 MG/1
50000 CAPSULE, LIQUID FILLED ORAL WEEKLY
Qty: 5 CAPSULE | Refills: 0 | OUTPATIENT
Start: 2024-12-02

## 2024-12-09 ENCOUNTER — HOSPITAL ENCOUNTER (OUTPATIENT)
Dept: ULTRASOUND IMAGING | Facility: HOSPITAL | Age: 29
Discharge: HOME | End: 2024-12-09
Admitting: INTERNAL MEDICINE
Payer: COMMERCIAL

## 2024-12-09 DIAGNOSIS — E04.1 THYROID NODULE: ICD-10-CM

## 2024-12-09 PROCEDURE — 76536 US EXAM OF HEAD AND NECK: CPT

## 2024-12-12 DIAGNOSIS — F90.9 ADULT ADHD: ICD-10-CM

## 2024-12-12 RX ORDER — DEXTROAMPHETAMINE SACCHARATE, AMPHETAMINE ASPARTATE MONOHYDRATE, DEXTROAMPHETAMINE SULFATE AND AMPHETAMINE SULFATE 5; 5; 5; 5 MG/1; MG/1; MG/1; MG/1
20 CAPSULE, EXTENDED RELEASE ORAL EVERY MORNING
Qty: 30 CAPSULE | Refills: 0 | OUTPATIENT
Start: 2024-12-12 | End: 2025-01-11

## 2024-12-12 RX ORDER — DEXTROAMPHETAMINE SACCHARATE, AMPHETAMINE ASPARTATE, DEXTROAMPHETAMINE SULFATE AND AMPHETAMINE SULFATE 2.5; 2.5; 2.5; 2.5 MG/1; MG/1; MG/1; MG/1
10 TABLET ORAL DAILY
Qty: 30 TABLET | Refills: 0 | OUTPATIENT
Start: 2024-12-12 | End: 2025-01-11

## 2024-12-12 NOTE — TELEPHONE ENCOUNTER
REFILL REQUEST:     amphetamine-dextroamphetamine XR (Adderall XR) 20 MG 24 hr capsule (12/03/2024)    -THIS WAS SENT INTO THE PHARMACY ON 12/03/2024.    amphetamine-dextroamphetamine (Adderall) 10 MG tablet (11/25/2024)    -THIS MG WAS PICKED UP ON 11/25/2024 ACCORDING TO PTS MEDICATION DISPENSE HISTORY REPORT. PT SHOULDN'T BE DUE UNTIL AROUND 12/25/2024.    F/UP- 02/10/2025.  LOV: 09/30/2024.

## 2024-12-18 ENCOUNTER — TRANSCRIBE ORDERS (OUTPATIENT)
Dept: ADMINISTRATIVE | Facility: HOSPITAL | Age: 29
End: 2024-12-18
Payer: COMMERCIAL

## 2024-12-20 DIAGNOSIS — F90.9 ADULT ADHD: ICD-10-CM

## 2024-12-20 RX ORDER — DEXTROAMPHETAMINE SACCHARATE, AMPHETAMINE ASPARTATE, DEXTROAMPHETAMINE SULFATE AND AMPHETAMINE SULFATE 2.5; 2.5; 2.5; 2.5 MG/1; MG/1; MG/1; MG/1
10 TABLET ORAL DAILY
Qty: 30 TABLET | Refills: 0 | Status: SHIPPED | OUTPATIENT
Start: 2024-12-23 | End: 2025-01-22

## 2024-12-20 NOTE — TELEPHONE ENCOUNTER
REFILL REQUEST:     amphetamine-dextroamphetamine (Adderall) 10 MG tablet (11/25/2024)     F/UP- 02/10/2025.  LOV: 09/30/2024.    PT CANCELED APPT ON 12/16/2024.    LAST UDS:  Urine Drug Screen - Urine, Clean Catch (04/01/2024 11:44)     Patient Comment: Due December 25th. Not sure if your office will be open next week to request refill then.

## 2024-12-28 DIAGNOSIS — F90.9 ADULT ADHD: ICD-10-CM

## 2024-12-30 RX ORDER — DEXTROAMPHETAMINE SACCHARATE, AMPHETAMINE ASPARTATE MONOHYDRATE, DEXTROAMPHETAMINE SULFATE AND AMPHETAMINE SULFATE 5; 5; 5; 5 MG/1; MG/1; MG/1; MG/1
20 CAPSULE, EXTENDED RELEASE ORAL EVERY MORNING
Qty: 30 CAPSULE | Refills: 0 | Status: SHIPPED | OUTPATIENT
Start: 2025-01-03 | End: 2025-02-02

## 2025-01-02 ENCOUNTER — HOSPITAL ENCOUNTER (OUTPATIENT)
Dept: ULTRASOUND IMAGING | Facility: HOSPITAL | Age: 30
Discharge: HOME | End: 2025-01-02
Payer: COMMERCIAL

## 2025-01-02 DIAGNOSIS — E04.1 THYROID NODULE: ICD-10-CM

## 2025-01-02 PROCEDURE — 76942 ECHO GUIDE FOR BIOPSY: CPT

## 2025-01-02 PROCEDURE — 88173 CYTOPATH EVAL FNA REPORT: CPT | Performed by: INTERNAL MEDICINE

## 2025-01-02 PROCEDURE — 25010000002 LIDOCAINE 1 % SOLUTION

## 2025-01-02 RX ORDER — LIDOCAINE HYDROCHLORIDE 10 MG/ML
INJECTION, SOLUTION INFILTRATION; PERINEURAL
Status: COMPLETED
Start: 2025-01-02 | End: 2025-01-02

## 2025-01-02 RX ADMIN — LIDOCAINE HYDROCHLORIDE: 10 INJECTION, SOLUTION INFILTRATION; PERINEURAL at 10:49

## 2025-01-03 LAB
CYTO UR: NORMAL
LAB AP CASE REPORT: NORMAL
LAB AP CLINICAL INFORMATION: NORMAL
PATH REPORT.FINAL DX SPEC: NORMAL
PATH REPORT.GROSS SPEC: NORMAL

## 2025-01-17 DIAGNOSIS — F90.9 ADULT ADHD: ICD-10-CM

## 2025-01-17 RX ORDER — DEXTROAMPHETAMINE SACCHARATE, AMPHETAMINE ASPARTATE, DEXTROAMPHETAMINE SULFATE AND AMPHETAMINE SULFATE 2.5; 2.5; 2.5; 2.5 MG/1; MG/1; MG/1; MG/1
10 TABLET ORAL DAILY
Qty: 30 TABLET | Refills: 0 | Status: SHIPPED | OUTPATIENT
Start: 2025-01-22 | End: 2025-02-21

## 2025-01-17 NOTE — TELEPHONE ENCOUNTER
Appointment with Olga Jiang APRN (02/10/2025)       Urine Drug Screen - Urine, Clean Catch (04/01/2024 11:44)       CONTROLLED SUBSTANCE AGREEMENT - SCAN - CONTROLLED SUBSTANCE AGREEMENT, BEHAVIORAL HEALTH, 04/01/2024 (04/01/2024)

## 2025-01-25 DIAGNOSIS — F90.9 ADULT ADHD: ICD-10-CM

## 2025-01-27 RX ORDER — DEXTROAMPHETAMINE SACCHARATE, AMPHETAMINE ASPARTATE MONOHYDRATE, DEXTROAMPHETAMINE SULFATE AND AMPHETAMINE SULFATE 5; 5; 5; 5 MG/1; MG/1; MG/1; MG/1
20 CAPSULE, EXTENDED RELEASE ORAL EVERY MORNING
Qty: 30 CAPSULE | Refills: 0 | Status: SHIPPED | OUTPATIENT
Start: 2025-02-02 | End: 2025-03-04

## 2025-01-27 NOTE — TELEPHONE ENCOUNTER
CONTROLLED MEDICATION REFILL REQUEST    STATE REGULATION APPT EVERY 3 MONTHS     UDS(URINE DRUG SCREEN) EVERY 6 MONTHS OR UP TO PROVIDER PREFERENCE   (LANG GLASGOW & EMILIANA 1 PER YEAR)     NEW NARC CONSENT EVERY YEAR      MEDICATION: amphetamine-dextroamphetamine XR (Adderall XR) 20 MG 24 hr capsule (01/03/2025)  amphetamine-dextroamphetamine (Adderall) 10 MG tablet (01/22/2025)     NEXT OFFICE VISIT: Appointment with Olga Jiang APRN (02/10/2025)     LAST OFFICE VISIT: Office Visit with Olga Jiang APRN (09/30/2024)     NARC CONSENT: CONTROLLED SUBSTANCE AGREEMENT - SCAN - CONTROLLED SUBSTANCE AGREEMENT, BEHAVIORAL HEALTH, 04/01/2024 (04/01/2024)     URINE DRUG SCREEN(STANDING ORDER)   (LANG HOPSON 1 PER YEAR): Urine Drug Screen - Urine, Clean Catch (04/01/2024 11:44)     PROVIDER PLEASE ADVISE

## 2025-02-10 ENCOUNTER — OFFICE VISIT (OUTPATIENT)
Dept: PSYCHIATRY | Facility: CLINIC | Age: 30
End: 2025-02-10
Payer: COMMERCIAL

## 2025-02-10 VITALS
HEIGHT: 66 IN | DIASTOLIC BLOOD PRESSURE: 68 MMHG | BODY MASS INDEX: 26.2 KG/M2 | WEIGHT: 163 LBS | SYSTOLIC BLOOD PRESSURE: 106 MMHG | HEART RATE: 68 BPM

## 2025-02-10 DIAGNOSIS — F51.04 INSOMNIA, PSYCHOPHYSIOLOGICAL: Primary | ICD-10-CM

## 2025-02-10 DIAGNOSIS — F90.9 ADULT ADHD: ICD-10-CM

## 2025-02-10 DIAGNOSIS — F39 MOOD DISORDER: ICD-10-CM

## 2025-02-10 PROCEDURE — 1159F MED LIST DOCD IN RCRD: CPT

## 2025-02-10 PROCEDURE — 1160F RVW MEDS BY RX/DR IN RCRD: CPT

## 2025-02-10 PROCEDURE — 99214 OFFICE O/P EST MOD 30 MIN: CPT

## 2025-02-10 RX ORDER — DEXTROAMPHETAMINE SACCHARATE, AMPHETAMINE ASPARTATE, DEXTROAMPHETAMINE SULFATE AND AMPHETAMINE SULFATE 2.5; 2.5; 2.5; 2.5 MG/1; MG/1; MG/1; MG/1
10 TABLET ORAL DAILY
Qty: 30 TABLET | Refills: 0 | Status: SHIPPED | OUTPATIENT
Start: 2025-02-21 | End: 2025-03-23

## 2025-02-10 NOTE — PROGRESS NOTES
"[facility]  Follow-up Visit    Chief Complaint: \"I would like to be screened for ADHD, Bipolar disorder, and other mental illnesses\"     History of Present Illness: Christin Morris is a 28 y.o. female who presents today for initial evaluation regarding consultation and screenings for BP, BPD, Adult ADHD, and anxiety.  Christin presents unaccompanied in no acute distress and engages with me appropriately. Psychotropic regimen with which patient presents is described as fluoxetine 40 mg +20 mg po =60 mg po daily.      History is positive for signs/symptoms suggestive of alternating symptoms of elevated (irritability) and depressed moods that do not conform to temporal parameters of bipolar disorder and are not sufficiently encapsulated by MDD. With regard to ADHD: I am presumptively treating patient for this issue; history as provided today, presentation today, distinct possibility this is a contributing factor to patient's dysfunction in  roles and engagements irrespective to screening results.   Patient has learned and successfully implemented compensatory coping skills that, with the Druze of layered stressors throughout adulthood, have begun to fail. Treating ADHD symptoms will likely be a concise and appropriate route to address anxiety and mood issues that are, at least to some degree, secondary to deficits related to traits of ADHD.      ASRS-v1.1  Part A  6/6  Part B  12/12     Total  18/18            Patient (suspected)  history of early exposure to enduring trauma associated with re-experiencing trauma, avoidance, hyperarousal (PTSD) and difficulty managing emotions, negative self-view, relationship difficulties, dissociative symptoms, and demoralization (complex PTSD).   Psychiatric screening is negative for pathognomonic history of: violence, tara, TBI, PTSD.     I have counseled the patient with regard to diagnoses and the recommended treatment regimen as " documented below: I will assume prescriptive responsibility for probable stimulant for Adult ADHD. I will be likely prescribing amphetamine-dextroamphetamine XR 10 mg  for ADHD. Patient has been made aware of the long-term risks of tachyphylaxis/dependence and uncomfortable withdrawal that may occur with abrupt discontinuation of this agent. I have advised taking medication holidays to mitigate risk of dependence and tolerance and have advised the patient with regard to common psychological dependence that can contribute to perceived diminishment in treatment effectiveness. Patient has been advised to monitor and limit caffeine intake while taking this agent. Patient has been made aware of common SE - diminished appetite, insomnia, hypertension - for which this agent has propensity. I have specifically reviewed issues related to misuse and diversion with the patient today.  Patient acknowledges the diagnoses per my rendered interpretation. Patient demonstrates awareness/understanding of viable alternatives for treatment as well as potential risks, benefits, and side effects associated with this regimen and is amenable to proceed in this fashion.      Recommended lifestyle changes: 30 minutes of activity to increase HR 2-3 days weekly.     Psychiatric History:  Diagnoses: diagnosed by primary with depression and anxiety  Outpatient history: no therapy  Inpatient history: yes, on 72 hour hold, for OD trazodone  Medication trials: fluoxetine 60 mg po, paroxetine, and bupropion  Other treatment modalities: none  Self harm: No history  Suicide attempts: Yes  Abuse or neglect: emotional     Substance Abuse History:   Types/methods/frequency: *none  Transtheoretical stage: non     Social History:  Residence: lives house, with boyfriend, daughters, and 2 cats  Vocation: yes  Source of income: Employed  Last grade completed: college, Bachelors health administration  Pertinent developmental history: none  Pertinent legal  "history: No history   Hobbies/interests: plays with kids  Islam: N/A  Exercise:walking 20 minutes/day  5 days/week   Dietary habits: no pertinent issues   Sleep hygiene:  variable, hours and days with energy, days where is takes longer to get out of bed  Social habits: no pertinent issues   Sunlight: There are no concern for under-exposure.  Caffeine intake:  coffee  Hydration habits: no pertinent issues    history: No    Interval History Christin is a 29 y.o. female who presents today for follow-up. Christin presents unaccompanied in no acute distress and engages with me appropriately.   Current treatment regimen includes:   -Adderall 10 mg p.o. q. afternoon   Adderall XR 20 mg p.o. every morning   fluoxetine 60 mg p.o. daily-will reduce 40 mg po   Side-effects per given history: palpitations, sexual side effects.      Today the patient feels \"doing ok.\"  She complains about some sexual side effects with the Prozac, she has tried Wellbutrin in the past and reports it makes her \"bitchy.' She reports lack of libido as well as lack of physical sensation.  She voices some concern about spending more money and making impulsive purchases.  Thought process and content are devoid of overt aberration suggestive of acute tara/psychosis. The patient denies SI/HI/AVH. There are changes on exam today compared to most recent evaluation.    - sleep: no concerns  - appetite: poorly controlled  Will reduce current fluoxetine to 40 mg p.o. from 60 to see if this helps reduce sexual side effects.  Discussed using bupropion to offset the sexual side effects but patient declines due to poor results from previous use.  Urged patient to set a spending budget per week and to stick to this budget as well as keeping a spending journal to see when/what/where she overspends. Recommended patient to research topical arousal serums like Karlee or Play to help aid in the lack of sensation she reports. Patient aware that these OTC " serums have no FDA indications, and side effects are not clearly known.   I have counseled the patient with regard to diagnoses and the recommended treatment regimen as documented below. Patient acknowledges the diagnoses per my rendered interpretation. Patient demonstrates understanding of potential risks/benefits/side effects associated with this regimen and is amenable to proceed in this fashion.       Social History     Socioeconomic History    Marital status: Single   Tobacco Use    Smoking status: Never     Passive exposure: Past    Smokeless tobacco: Never   Vaping Use    Vaping status: Never Used   Substance and Sexual Activity    Alcohol use: Never    Drug use: Never    Sexual activity: Yes     Partners: Male     Birth control/protection: None       Tobacco use counseling/intervention: patient does not use tobacco.   Patient Active Problem List   Diagnosis    Migraine headache    Hypertrophy of both inferior nasal turbinates    Deviated nasal septum    Anxiety    Seasonal allergic rhinitis    Peripheral venous insufficiency    Recurrent acute sinusitis    Palpitations    Abnormal Holter exam     Allergy:   Allergies   Allergen Reactions    Macrobid [Nitrofurantoin] Other (See Comments)    Penicillins Other (See Comments)    Sulfa Antibiotics Other (See Comments)        Discontinued Medications:  Medications Discontinued During This Encounter   Medication Reason    galcanezumab-gnlm (Emgality) 120 MG/ML auto-injector pen Patient Reported Not Taking    amphetamine-dextroamphetamine (Adderall) 10 MG tablet Reorder       Current Medications:   Current Outpatient Medications   Medication Sig Dispense Refill    [START ON 2/21/2025] amphetamine-dextroamphetamine (Adderall) 10 MG tablet Take 1 tablet by mouth Daily for 30 days. Take one tablet by mouth in the afternoon booster dose. 30 tablet 0    amphetamine-dextroamphetamine XR (Adderall XR) 20 MG 24 hr capsule Take 1 capsule by mouth Every Morning for 30 days 30  capsule 0    cetirizine (zyrTEC) 10 MG tablet Zyrtec 10 mg oral tablet take 1 tablet (10 mg) by oral route once daily   Active      FLUoxetine (PROzac) 40 MG capsule       fluticasone (FLONASE) 50 MCG/ACT nasal spray 2 sprays into the nostril(s) as directed by provider Daily. 11.1 mL 0    folic acid (FOLVITE) 1 MG tablet Take 1 tablet by mouth Daily. 30 tablet 5    propranolol (INDERAL) 20 MG tablet TAKE 0.5 - 1 TABLET BY MOUTH TWICE DAILY      Semaglutide-Weight Management 2.4 MG/0.75ML solution auto-injector Inject 2.4 mg under the skin into the appropriate area as directed 1 (One) Time Per Week. 3 mL 2    vitamin D (ERGOCALCIFEROL) 1.25 MG (27320 UT) capsule capsule Take 1 capsule by mouth 1 (One) Time Per Week. 5 capsule 2     No current facility-administered medications for this visit.     Past Medical History:  Past Medical History:   Diagnosis Date    ADHD (attention deficit hyperactivity disorder) 2024    Allergic     PCN, Sulfa    Allergic rhinitis     Anxiety     Bipolar disorder 2024    Bulimia nervosa started in high school        Chronic migraine     Depression     Deviated nasal septum     Disease of thyroid gland Graves Disease        Hyperthyroidism     Graves Diseases     Hypertrophy of both inferior nasal turbinates     IUD (intrauterine device) in place     Myofascial muscle pain     PTSD (post-traumatic stress disorder)     Complex ptsd    Recurrent acute sinusitis     Suicide attempt 10/15/2015    Tendonitis of ankle     TMJ dysfunction      Past Surgical History:  Past Surgical History:   Procedure Laterality Date    ABDOMINAL SURGERY  2021         SECTION      GANGLION CYST EXCISION         MENTAL STATUS EXAM   General Appearance:  Cleanly groomed and dressed and well developed  Eye Contact:  Good eye contact  Attitude:  Cooperative, polite and candid  Motor Activity:  Normal gait, posture  Muscle Strength:  Normal  Speech:  Normal rate, tone,  "volume  Language:  Spontaneous  Mood and affect:  Normal, pleasant  Hopelessness:  Denies  Loneliness: Denies  Thought Process:  Logical and goal-directed  Associations/ Thought Content:  No delusions  Hallucinations:  None  Suicidal Ideations:  Not present  Homicidal Ideation:  Not present  Sensorium:  Alert and clear  Orientation:  Person  Immediate Recall, Recent, and Remote Memory:  Intact  Attention Span/ Concentration:  Good  Fund of Knowledge:  Appropriate for age and educational level  Intellectual Functioning:  Average range  Insight:  Good  Judgement:  Good  Reliability:  Good  Impulse Control:  Good      Vital Signs:   /68   Pulse 68   Ht 167.6 cm (66\")   Wt 73.9 kg (163 lb)   BMI 26.31 kg/m²    Lab Results:   Admission on 01/11/2025, Discharged on 01/11/2025   Component Date Value Ref Range Status    SARS Antigen 01/11/2025 Not Detected  Not Detected, Presumptive Negative Final    Influenza A Antigen KELSEY 01/11/2025 Not Detected  Not Detected Final    Influenza B Antigen KELSEY 01/11/2025 Not Detected  Not Detected Final    Internal Control 01/11/2025 Passed  Passed Final    Lot Number 01/11/2025 [id number]   Final    Expiration Date 01/11/2025 10/18/2025   Final    Rapid Strep A Screen 01/11/2025 Negative   Final    Internal Control 01/11/2025 Passed   Final    Lot Number 01/11/2025 [id number]   Final    Expiration Date 01/11/2025 12/11/2026   Final   Hospital Outpatient Visit on 01/02/2025   Component Date Value Ref Range Status    Case Report 01/02/2025    Final                    Value:Medical Cytology Report                           Case: [id number]                                  Authorizing Provider:  Renato Gage MD      Collected:           01/02/2025 11:06 AM          Ordering Location:     [facility]   Received:            01/03/2025 09:33 AM          Pathologist:           Laureen Nguyen MD                                                     Specimen:    " Thyroid, Left; 10:53                                                                       Final Diagnosis 01/02/2025    Final                    Value:Thyroid gland, left lobe, FNA:   - Colloid nodule (Hindman category II: Benign)        Clinical Information 01/02/2025    Final                    Value:Thyroid nodule.      Gross Description 01/02/2025    Final                    Value:1. Thyroid.  Fine Needle Aspiration, thyroid, left lobe       40 cc total volume in cytofixative received, 8 prepared smears received in ETOH, and one vial with an additional pass collected and held for possible reflex testing (1 cytospin prep prepared in addition to the 8 prepared smears received).          Microscopic Description 01/02/2025    Final                    Value:Microscopic examination performed.     Office Visit on 09/23/2024   Component Date Value Ref Range Status    25 Hydroxy, Vitamin D 09/23/2024 25.3 (L)  30.0 - 100.0 ng/ml Final    Folate 09/23/2024 6.21  4.78 - 24.20 ng/mL Final    Vitamin B-12 09/23/2024 743  211 - 946 pg/mL Final    WBC 09/23/2024 10.66  3.40 - 10.80 10*3/mm3 Final    RBC 09/23/2024 4.76  3.77 - 5.28 10*6/mm3 Final    Hemoglobin 09/23/2024 14.5  12.0 - 15.9 g/dL Final    Hematocrit 09/23/2024 45.2  34.0 - 46.6 % Final    MCV 09/23/2024 95.0  79.0 - 97.0 fL Final    MCH 09/23/2024 30.5  26.6 - 33.0 pg Final    MCHC 09/23/2024 32.1  31.5 - 35.7 g/dL Final    RDW 09/23/2024 11.5 (L)  12.3 - 15.4 % Final    RDW-SD 09/23/2024 39.7  37.0 - 54.0 fl Final    MPV 09/23/2024 10.5  6.0 - 12.0 fL Final    Platelets 09/23/2024 297  140 - 450 10*3/mm3 Final    Glucose 09/23/2024 86  65 - 99 mg/dL Final    BUN 09/23/2024 12  6 - 20 mg/dL Final    Creatinine 09/23/2024 0.91  0.57 - 1.00 mg/dL Final    Sodium 09/23/2024 139  136 - 145 mmol/L Final    Potassium 09/23/2024 3.4 (L)  3.5 - 5.2 mmol/L Final    Chloride 09/23/2024 104  98 - 107 mmol/L Final    CO2 09/23/2024 22.6  22.0 - 29.0 mmol/L Final    Calcium  09/23/2024 9.5  8.6 - 10.5 mg/dL Final    Total Protein 09/23/2024 7.1  6.0 - 8.5 g/dL Final    Albumin 09/23/2024 4.2  3.5 - 5.2 g/dL Final    ALT (SGPT) 09/23/2024 15  1 - 33 U/L Final    AST (SGOT) 09/23/2024 16  1 - 32 U/L Final    Alkaline Phosphatase 09/23/2024 52  39 - 117 U/L Final    Total Bilirubin 09/23/2024 0.6  0.0 - 1.2 mg/dL Final    Globulin 09/23/2024 2.9  gm/dL Final    A/G Ratio 09/23/2024 1.4  g/dL Final    BUN/Creatinine Ratio 09/23/2024 13.2  7.0 - 25.0 Final    Anion Gap 09/23/2024 12.4  5.0 - 15.0 mmol/L Final    eGFR 09/23/2024 86.7  >60.0 mL/min/1.73 Final    TSH 09/23/2024 0.224 (L)  0.270 - 4.200 uIU/mL Final       ASSESSMENT AND PLAN:    ICD-10-CM ICD-9-CM   1. Insomnia, psychophysiological  F51.04 307.42   2. Adult ADHD  F90.9 314.01   3. Mood disorder  F39 296.90       Christin is a 29 y.o. female who presents today for follow-up regarding medication management. We have discussed the interval history and the treatment plan below, including potential R/B/SE of the recommended regimen of which the patient demonstrates understanding. Patient is agreeable to call 911 or go to the nearest ER should she become concerned for her own safety and/or the safety of those around her. There are are no overt indices of acute tara/psychosis on exam today. SEAN reviewed and is as expected.    Medication regimen: Reduce current dose of fluoxetine to 40 mg p.o. daily ,continue current Adderall XR 20 mg p.o. every morning continue adderall booster 10 mg po q afternoon; patient is advised not to misuse prescribed medications or to use them with any exogenous substances that aren't disclosed to this provider as they may interact with the regimen to the patient's detriment.   Risk Assessment: protracted risk is moderate, imminent risk is moderate. Do note that this is subject to change with the [employer] of new stressors, treatment non-adherence, use of substances, and/or new medical ails.   Monitoring:  reviewed labs/imaging as populated above; ordered  Therapy: refer to [facility]   Follow-up: 2 mos  Communications: N/A    TREATMENT PLAN/GOALS: challenge patterns of living conducive to symptom burden, implement recommended regimen as above with augmentative, intermittent supportive psychotherapy to reduce symptom burden. Patient acknowledged and verbally consented to continue treatment. The importance of adherence to the recommended treatment and interval follow-up appointments was again emphasized today: patient has good treatment adherence per given history. Patient was today reminded to limit daily caffeine intake, hydrate appropriately, eat healthy and nutritious foods, engage sleep hygiene measures, engage appropriate exposure to sunlight, engage with hobbies in balance with life necessities, and exercise appropriate to their capacity to do so.       Parts of this note are electronic transcriptions/translations of spoken language to printed text using the Dragon Dictation system.    Electronically signed by ANTONIA Simmons, 02/10/25

## 2025-02-25 DIAGNOSIS — F90.9 ADULT ADHD: ICD-10-CM

## 2025-02-26 RX ORDER — DEXTROAMPHETAMINE SACCHARATE, AMPHETAMINE ASPARTATE MONOHYDRATE, DEXTROAMPHETAMINE SULFATE AND AMPHETAMINE SULFATE 5; 5; 5; 5 MG/1; MG/1; MG/1; MG/1
20 CAPSULE, EXTENDED RELEASE ORAL EVERY MORNING
Qty: 30 CAPSULE | Refills: 0 | Status: SHIPPED | OUTPATIENT
Start: 2025-03-01 | End: 2025-03-31

## 2025-02-26 NOTE — TELEPHONE ENCOUNTER
CONTROLLED MEDICATION REFILL REQUEST    STATE REGULATION APPT EVERY 3 MONTHS     UDS(URINE DRUG SCREEN) EVERY 6 MONTHS OR UP TO PROVIDER PREFERENCE   (LANG GLASGOW & EMILIANA 1 PER YEAR)     NEW NARC CONSENT EVERY YEAR      MEDICATION: amphetamine-dextroamphetamine XR (Adderall XR) 20 MG 24 hr capsule (02/02/2025)  amphetamine-dextroamphetamine (Adderall) 10 MG tablet (02/21/2025)    TOO SOON FOR REFILLS      NEXT OFFICE VISIT: Appointment with Olga Jiang APRN (04/07/2025)     LAST OFFICE VISIT: Office Visit with Olga Jiang APRN (02/10/2025)     NARC CONSENT: CONTROLLED SUBSTANCE AGREEMENT - SCAN - CONTROLLED SUBSTANCE AGREEMENT, BEHAVIORAL HEALTH, 04/01/2024 (04/01/2024)     URINE DRUG SCREEN(STANDING ORDER)   (LANG GLASGOW & EMILIANA 1 PER YEAR): Urine Drug Screen - Urine, Clean Catch (04/01/2024 11:44)     PROVIDER PLEASE ADVISE    48.5

## 2025-03-16 DIAGNOSIS — F90.9 ADULT ADHD: ICD-10-CM

## 2025-03-17 RX ORDER — DEXTROAMPHETAMINE SACCHARATE, AMPHETAMINE ASPARTATE, DEXTROAMPHETAMINE SULFATE AND AMPHETAMINE SULFATE 2.5; 2.5; 2.5; 2.5 MG/1; MG/1; MG/1; MG/1
10 TABLET ORAL DAILY
Qty: 30 TABLET | Refills: 0 | Status: SHIPPED | OUTPATIENT
Start: 2025-03-20 | End: 2025-04-19

## 2025-03-17 NOTE — TELEPHONE ENCOUNTER
amphetamine-dextroamphetamine (Adderall) 10 MG tablet     Last ordered: 1 month ago (2/10/2025) by ANTONIA Simmons     FOLLOW UP 04/07/2025

## 2025-03-20 DIAGNOSIS — E53.8 FOLIC ACID DEFICIENCY: ICD-10-CM

## 2025-03-20 RX ORDER — FOLIC ACID 1 MG/1
1000 TABLET ORAL DAILY
Qty: 30 TABLET | Refills: 0 | Status: SHIPPED | OUTPATIENT
Start: 2025-03-20

## 2025-03-21 DIAGNOSIS — F90.9 ADULT ADHD: ICD-10-CM

## 2025-03-21 RX ORDER — DEXTROAMPHETAMINE SACCHARATE, AMPHETAMINE ASPARTATE MONOHYDRATE, DEXTROAMPHETAMINE SULFATE AND AMPHETAMINE SULFATE 5; 5; 5; 5 MG/1; MG/1; MG/1; MG/1
20 CAPSULE, EXTENDED RELEASE ORAL EVERY MORNING
Qty: 30 CAPSULE | Refills: 0 | Status: SHIPPED | OUTPATIENT
Start: 2025-03-31 | End: 2025-04-30

## 2025-03-21 NOTE — TELEPHONE ENCOUNTER
amphetamine-dextroamphetamine XR (Adderall XR) 20 MG 24 hr capsule       Last ordered: 3 weeks ago (2/26/2025) by ANTONIA Simmons     Follow up 04/07/2025

## 2025-03-30 ENCOUNTER — TELEMEDICINE (OUTPATIENT)
Dept: FAMILY MEDICINE CLINIC | Facility: TELEHEALTH | Age: 30
End: 2025-03-30
Payer: COMMERCIAL

## 2025-03-30 VITALS — TEMPERATURE: 98 F | HEART RATE: 89 BPM

## 2025-03-30 DIAGNOSIS — R09.89 CHEST CONGESTION: ICD-10-CM

## 2025-03-30 DIAGNOSIS — J06.9 ACUTE URI: Primary | ICD-10-CM

## 2025-03-30 RX ORDER — ALBUTEROL SULFATE 90 UG/1
2 INHALANT RESPIRATORY (INHALATION) EVERY 4 HOURS PRN
Qty: 18 G | Refills: 0 | Status: SHIPPED | OUTPATIENT
Start: 2025-03-30 | End: 2025-04-29

## 2025-03-30 RX ORDER — BROMPHENIRAMINE MALEATE, PSEUDOEPHEDRINE HYDROCHLORIDE, AND DEXTROMETHORPHAN HYDROBROMIDE 2; 30; 10 MG/5ML; MG/5ML; MG/5ML
5 SYRUP ORAL 4 TIMES DAILY PRN
Qty: 118 ML | Refills: 0 | Status: SHIPPED | OUTPATIENT
Start: 2025-03-30 | End: 2025-04-09

## 2025-03-30 NOTE — PROGRESS NOTES
No chief complaint on file.      Video Visit Reason:   Free Text Description: Glo - worsening cough with chest tightness, sinus pressure  Jolanta Morris is a 29 y.o. female.     History of Present Illness  The patient presents via virtual visit for evaluation of a worsening cough with chest tightness and some sinus pressure.    She reports that her symptoms began approximately 2 days ago, initially presenting as postnasal drainage. By yesterday, she developed a cough that has progressively worsened overnight. The cough is productive, characterized by phlegm, and is accompanied by chest tightness and soreness. She also experiences mild body aches but has not observed any fever or sore throat. Additionally, she reports slight ear pressure. She is currently on Zyrtec and Flonase. She has a known allergy to MACROBID, PENICILLIN, and SULFA. Apart from seasonal allergies, she has no history of asthma.    ALLERGIES  She is allergic to MACROBID, PENICILLIN, and SULFA.    MEDICATIONS  Zyrtec, Flonase        The following portions of the patient's history were reviewed and updated as appropriate: allergies, current medications, past medical history, and problem list.      Past Medical History:   Diagnosis Date    ADHD (attention deficit hyperactivity disorder) April 2024    Allergic     PCN, Sulfa    Allergic rhinitis     Anxiety     Bipolar disorder April 2024    Bulimia nervosa started in high school    2012    Chronic migraine     Depression     Deviated nasal septum     Disease of thyroid gland Graves Disease    2021    Hyperthyroidism     Graves Diseases 2022    Hypertrophy of both inferior nasal turbinates     IUD (intrauterine device) in place     Myofascial muscle pain     PTSD (post-traumatic stress disorder)     Complex ptsd    Recurrent acute sinusitis     Suicide attempt 10/15/2015    Tendonitis of ankle     TMJ dysfunction      Social History     Socioeconomic History    Marital status: Single    Tobacco Use    Smoking status: Never     Passive exposure: Past    Smokeless tobacco: Never   Vaping Use    Vaping status: Never Used   Substance and Sexual Activity    Alcohol use: Never    Drug use: Never    Sexual activity: Yes     Partners: Male     Birth control/protection: None     medication documentation: reviewed and updated with patient and   Current Outpatient Medications:     amphetamine-dextroamphetamine (Adderall) 10 MG tablet, Take 1 tablet by mouth Daily for 30 days. Take one tablet by mouth in the afternoon booster dose., Disp: 30 tablet, Rfl: 0    [START ON 3/31/2025] amphetamine-dextroamphetamine XR (Adderall XR) 20 MG 24 hr capsule, Take 1 capsule by mouth Every Morning for 30 days, Disp: 30 capsule, Rfl: 0    cetirizine (zyrTEC) 10 MG tablet, Zyrtec 10 mg oral tablet take 1 tablet (10 mg) by oral route once daily   Active, Disp: , Rfl:     FLUoxetine (PROzac) 40 MG capsule, , Disp: , Rfl:     fluticasone (FLONASE) 50 MCG/ACT nasal spray, 2 sprays into the nostril(s) as directed by provider Daily., Disp: 11.1 mL, Rfl: 0    folic acid (FOLVITE) 1 MG tablet, TAKE 1 TABLET BY MOUTH EVERY DAY, Disp: 30 tablet, Rfl: 0    propranolol (INDERAL) 20 MG tablet, TAKE 0.5 - 1 TABLET BY MOUTH TWICE DAILY, Disp: , Rfl:     Semaglutide-Weight Management 2.4 MG/0.75ML solution auto-injector, Inject 2.4 mg under the skin into the appropriate area as directed 1 (One) Time Per Week., Disp: 3 mL, Rfl: 2    vitamin D (ERGOCALCIFEROL) 1.25 MG (78220 UT) capsule capsule, Take 1 capsule by mouth 1 (One) Time Per Week., Disp: 5 capsule, Rfl: 2    albuterol sulfate  (90 Base) MCG/ACT inhaler, Inhale 2 puffs Every 4 (Four) Hours As Needed for Wheezing or Shortness of Air for up to 30 days., Disp: 18 g, Rfl: 0    brompheniramine-pseudoephedrine-DM 30-2-10 MG/5ML syrup, Take 5 mL by mouth 4 (Four) Times a Day As Needed for Congestion or Cough for up to 10 days., Disp: 118 mL, Rfl: 0  Review of Systems  See  HPI  Objective   Pulse 89   Temp 98 °F (36.7 °C)   Tyto Exam  Physical Exam  Pharynx was mildly erythematous, cobblestone appearance.   Nose is congested. There is tenderness in the sinuses on both sides of the face.  Lungs are clear.  There is slight enlargement of lymph nodes under the neck, more so on the right side.        Assessment & Plan   Diagnoses and all orders for this visit:    1. Acute URI (Primary)  -     Covid-19 + Flu A&B AG, Veritor; Future  -     POC Rapid Strep A; Future  -     albuterol sulfate  (90 Base) MCG/ACT inhaler; Inhale 2 puffs Every 4 (Four) Hours As Needed for Wheezing or Shortness of Air for up to 30 days.  Dispense: 18 g; Refill: 0  -     brompheniramine-pseudoephedrine-DM 30-2-10 MG/5ML syrup; Take 5 mL by mouth 4 (Four) Times a Day As Needed for Congestion or Cough for up to 10 days.  Dispense: 118 mL; Refill: 0    2. Chest congestion  -     albuterol sulfate  (90 Base) MCG/ACT inhaler; Inhale 2 puffs Every 4 (Four) Hours As Needed for Wheezing or Shortness of Air for up to 30 days.  Dispense: 18 g; Refill: 0                  Follow Up:  If your symptoms are not resolving by the completion of your treatment or are worsening, see your primary care provider for follow up. If you don't have a primary care provider, you may go to any Urgent Care for re-evaluation. If you develop any life threatening symptoms, go to the nearest Emergency Department immediately or call EMS.       Patient or patient representative verbalized consent for the use of Ambient Listening during the visit with  ANTONIA Poon for chart documentation. 3/30/2025  12:45 EDT        The use of  Video Visit was utilized during this visit, using both [employer] and [employer]. The use of a video visit has been reviewed with the patient and verbal informed consent has been obtained. No technical difficulties occurred during the visit.    is located at [address] [place] [address]  Provider is located at  [place], [place]

## 2025-03-30 NOTE — PATIENT INSTRUCTIONS
Upper Respiratory Infection, Adult  An upper respiratory infection (URI) is a common viral infection of the nose, throat, and upper air passages that lead to the lungs. The most common type of URI is the common cold. URIs usually get better on their own, without medical treatment.  What are the causes?  A URI is caused by a virus. You may catch a virus by:  Breathing in droplets from an infected person's cough or sneeze.  Touching something that has been exposed to the virus (is contaminated) and then touching your mouth, nose, or eyes.  What increases the risk?  You are more likely to get a URI if:  You are very young or very old.  You have close contact with others, such as at work, school, or a health care facility.  You smoke.  You have long-term (chronic) heart or lung disease.  You have a weakened disease-fighting system (immune system).  You have nasal allergies or asthma.  You are experiencing a lot of stress.  You have poor nutrition.  What are the signs or symptoms?  A URI usually involves some of the following symptoms:  Runny or stuffy (congested) nose.  Cough.  Sneezing.  Sore throat.  Headache.  Fatigue.  Fever.  Loss of appetite.  Pain in your forehead, behind your eyes, and over your cheekbones (sinus pain).  Muscle aches.  Redness or irritation of the eyes.  Pressure in the ears or face.  How is this diagnosed?  This condition may be diagnosed based on your medical history and symptoms, and a physical exam. Your health care provider may use a swab to take a mucus sample from your nose (nasal swab). This sample can be tested to determine what virus is causing the illness.  How is this treated?  URIs usually get better on their own within 7-10 days. Medicines cannot cure URIs, but your health care provider may recommend certain medicines to help relieve symptoms, such as:  Over-the-counter cold medicines.  Cough suppressants. Coughing is a type of defense against infection that helps to clear the  respiratory system, so take these medicines only as recommended by your health care provider.  Fever-reducing medicines.  Follow these instructions at home:  Activity  Rest as needed.  If you have a fever, stay home from work or school until your fever is gone or until your health care provider says your URI cannot spread to other people (is no longer contagious). Your health care provider may have you wear a face mask to prevent your infection from spreading.  Relieving symptoms  Gargle with a mixture of salt and water 3-4 times a day or as needed. To make salt water, completely dissolve ½-1 tsp (3-6 g) of salt in 1 cup (237 mL) of warm water.  Use a cool-mist humidifier to add moisture to the air. This can help you breathe more easily.  Eating and drinking    Drink enough fluid to keep your urine pale yellow.  Eat soups and other clear broths.  General instructions    Take over-the-counter and prescription medicines only as told by your health care provider. These include cold medicines, fever reducers, and cough suppressants.  Do not use any products that contain nicotine or tobacco. These products include cigarettes, chewing tobacco, and vaping devices, such as e-cigarettes. If you need help quitting, ask your health care provider.  Stay away from secondhand smoke.  Stay up to date on all immunizations, including the yearly (annual) flu vaccine.  Keep all follow-up visits. This is important.  How to prevent the spread of infection to others  URIs can be contagious. To prevent the infection from spreading:  Wash your hands with soap and water for at least 20 seconds. If soap and water are not available, use hand .  Avoid touching your mouth, face, eyes, or nose.  Cough or sneeze into a tissue or your sleeve or elbow instead of into your hand or into the air.    Contact a health care provider if:  You are getting worse instead of better.  You have a fever or chills.  Your mucus is brown or red.  You have  yellow or brown discharge coming from your nose.  You have pain in your face, especially when you bend forward.  You have swollen neck glands.  You have pain while swallowing.  You have white areas in the back of your throat.  Get help right away if:  You have shortness of breath that gets worse.  You have severe or persistent:  Headache.  Ear pain.  Sinus pain.  Chest pain.  You have chronic lung disease along with any of the following:  Making high-pitched whistling sounds when you breathe, most often when you breathe out (wheezing).  Prolonged cough (more than 14 days).  Coughing up blood.  A change in your usual mucus.  You have a stiff neck.  You have changes in your:  Vision.  Hearing.  Thinking.  Mood.  These symptoms may be an emergency. Get help right away. Call 911.  Do not wait to see if the symptoms will go away.  Do not drive yourself to the hospital.  Summary  An upper respiratory infection (URI) is a common infection of the nose, throat, and upper air passages that lead to the lungs.  A URI is caused by a virus.  URIs usually get better on their own within 7-10 days.  Medicines cannot cure URIs, but your health care provider may recommend certain medicines to help relieve symptoms.  This information is not intended to replace advice given to you by your health care provider. Make sure you discuss any questions you have with your health care provider.  Document Revised: 07/20/2022 Document Reviewed: 07/20/2022  [employer] Patient Education © 2024 [employer]

## 2025-04-07 ENCOUNTER — OFFICE VISIT (OUTPATIENT)
Dept: PSYCHIATRY | Facility: CLINIC | Age: 30
End: 2025-04-07
Payer: COMMERCIAL

## 2025-04-07 ENCOUNTER — LAB (OUTPATIENT)
Dept: LAB | Facility: HOSPITAL | Age: 30
End: 2025-04-07
Payer: COMMERCIAL

## 2025-04-07 VITALS
HEIGHT: 66 IN | WEIGHT: 161.8 LBS | DIASTOLIC BLOOD PRESSURE: 62 MMHG | HEART RATE: 70 BPM | BODY MASS INDEX: 26 KG/M2 | SYSTOLIC BLOOD PRESSURE: 111 MMHG

## 2025-04-07 DIAGNOSIS — F90.9 ADULT ADHD: ICD-10-CM

## 2025-04-07 DIAGNOSIS — F51.04 INSOMNIA, PSYCHOPHYSIOLOGICAL: ICD-10-CM

## 2025-04-07 DIAGNOSIS — F39 MOOD DISORDER: Primary | ICD-10-CM

## 2025-04-07 LAB
AMPHET+METHAMPHET UR QL: POSITIVE
AMPHETAMINES UR QL: NEGATIVE
BARBITURATES UR QL SCN: NEGATIVE
BENZODIAZ UR QL SCN: NEGATIVE
BUPRENORPHINE SERPL-MCNC: NEGATIVE NG/ML
CANNABINOIDS SERPL QL: NEGATIVE
COCAINE UR QL: NEGATIVE
FENTANYL UR-MCNC: NEGATIVE NG/ML
METHADONE UR QL SCN: NEGATIVE
OPIATES UR QL: NEGATIVE
OXYCODONE UR QL SCN: NEGATIVE
PCP UR QL SCN: NEGATIVE
TRICYCLICS UR QL SCN: NEGATIVE

## 2025-04-07 PROCEDURE — 99214 OFFICE O/P EST MOD 30 MIN: CPT

## 2025-04-07 PROCEDURE — 1160F RVW MEDS BY RX/DR IN RCRD: CPT

## 2025-04-07 PROCEDURE — 1159F MED LIST DOCD IN RCRD: CPT

## 2025-04-07 PROCEDURE — 80307 DRUG TEST PRSMV CHEM ANLYZR: CPT

## 2025-04-07 RX ORDER — FLUOXETINE 10 MG/1
10 CAPSULE ORAL DAILY
Qty: 30 CAPSULE | Refills: 2 | Status: SHIPPED | OUTPATIENT
Start: 2025-04-07 | End: 2025-07-06

## 2025-04-07 RX ORDER — FLUCONAZOLE 150 MG/1
TABLET ORAL
COMMUNITY
Start: 2025-03-31

## 2025-04-07 RX ORDER — CEFDINIR 300 MG/1
1 CAPSULE ORAL EVERY 12 HOURS SCHEDULED
COMMUNITY
Start: 2025-04-02

## 2025-04-07 NOTE — PROGRESS NOTES
"[facility]  Follow-up Visit    Chief Complaint: \"I would like to be screened for ADHD, Bipolar disorder, and other mental illnesses\"     History of Present Illness: Christin Morris is a 28 y.o. female who presents today for initial evaluation regarding consultation and screenings for BP, BPD, Adult ADHD, and anxiety.  Christin presents unaccompanied in no acute distress and engages with me appropriately. Psychotropic regimen with which patient presents is described as fluoxetine 40 mg +20 mg po =60 mg po daily.      History is positive for signs/symptoms suggestive of alternating symptoms of elevated (irritability) and depressed moods that do not conform to temporal parameters of bipolar disorder and are not sufficiently encapsulated by MDD. With regard to ADHD: I am presumptively treating patient for this issue; history as provided today, presentation today, distinct possibility this is a contributing factor to patient's dysfunction in  roles and engagements irrespective to screening results.   Patient has learned and successfully implemented compensatory coping skills that, with the Congregational of layered stressors throughout adulthood, have begun to fail. Treating ADHD symptoms will likely be a concise and appropriate route to address anxiety and mood issues that are, at least to some degree, secondary to deficits related to traits of ADHD.      ASRS-v1.1  Part A  6/6  Part B  12/12     Total  18/18            Patient (suspected)  history of early exposure to enduring trauma associated with re-experiencing trauma, avoidance, hyperarousal (PTSD) and difficulty managing emotions, negative self-view, relationship difficulties, dissociative symptoms, and demoralization (complex PTSD).   Psychiatric screening is negative for pathognomonic history of: violence, tara, TBI, PTSD.     I have counseled the patient with regard to diagnoses and the recommended treatment regimen as " documented below: I will assume prescriptive responsibility for probable stimulant for Adult ADHD.I will be likely prescribing amphetamine-dextroamphetamine XR 10 mg  for ADHD. Patient has been made aware of the long-term risks of tachyphylaxis/dependence and uncomfortable withdrawal that may occur with abrupt discontinuation of this agent. I have advised taking medication holidays to mitigate risk of dependence and tolerance and have advised the patient with regard to common psychological dependence that can contribute to perceived diminishment in treatment effectiveness. Patient has been advised to monitor and limit caffeine intake while taking this agent. Patient has been made aware of common SE - diminished appetite, insomnia, hypertension - for which this agent has propensity. I have specifically reviewed issues related to misuse and diversion with the patient today.  Patient acknowledges the diagnoses per my rendered interpretation. Patient demonstrates awareness/understanding of viable alternatives for treatment as well as potential risks, benefits, and side effects associated with this regimen and is amenable to proceed in this fashion.      Recommended lifestyle changes: 30 minutes of activity to increase HR 2-3 days weekly.     Psychiatric History:  Diagnoses: diagnosed by primary with depression and anxiety  Outpatient history: no therapy  Inpatient history: yes, on 72 hour hold, for OD trazodone  Medication trials: fluoxetine 60 mg po, paroxetine, and bupropion  Other treatment modalities: none  Self harm: No history  Suicide attempts: Yes  Abuse or neglect: emotional     Substance Abuse History:   Types/methods/frequency: *none  Transtheoretical stage: non     Social History:  Residence: lives house, with boyfriend, daughters, and 2 cats  Vocation: yes  Source of income: Employed  Last grade completed: college, Bachelors health administration  Pertinent developmental history: none  Pertinent legal  "history: No history   Hobbies/interests: plays with kids  Denominational: N/A  Exercise:walking 20 minutes/day  5 days/week   Dietary habits: no pertinent issues   Sleep hygiene:  variable, hours and days with energy, days where is takes longer to get out of bed  Social habits: no pertinent issues   Sunlight: There are no concern for under-exposure.  Caffeine intake:  coffee  Hydration habits: no pertinent issues    history: No    Interval History Christin is a 29 y.o. female who presents today for follow-up. Christin presents unaccompanied in no acute distress and engages with me appropriately.   Current treatment regimen includes:   - Adderall IR 10 mg po q afternoon   Adderall XR 20 mg po q am   Fluoxetine 40 mg po q afternoon   Side-effects per given history: none.      Today the patient feels \"okay\" she reports that she has had no flooding. She is reporting feeling more scattered-past month that the either \"its the fluoxetine decrease\" or the \"Adderall is wearing off\" reporting that she has had periods of \"increased anxiety\" and an uptick of \"social anxiety\"-so much so that she reports that she opted out of psychotherapy at this time.  She does report slight sleep disruption-waking up once or twice during the night over the last month. She reports that she was not ready to begin therapy but will continue to think about it. She reports her social anxiety got the best of her. She reports increased body focused skin picking of nails and cuticles. Thought process and content are devoid of overt aberration suggestive of acute tara/psychosis. The patient denies SI/HI/AVH. There are not changes on exam today compared to most recent evaluation.    - sleep: no concerns-mild interruptions 1-2 times a night   - appetite: moderately controlled      Will increase the fluoxetine to 50 mg po q day to target anxiety and skin picking. Plan to continue other medications as prescribed. She is advised to report any negative " mood changes. She denies having racing thoughts, or need for reduced sleep. Advised that I would like the  patient to log her sleep for about a month to see if it trends in any particular pattern. We will monitor sleep by using sleep diary forms from [employer] for 4 weeks. Patient verbalizes understanding. New CSA for 2025 collected, and UDS ordered.     I have counseled the patient with regard to diagnoses and the recommended treatment regimen as documented below. Patient acknowledges the diagnoses per my rendered interpretation. Patient demonstrates understanding of potential risks/benefits/side effects associated with this regimen and is amenable to proceed in this fashion.       Social History     Socioeconomic History    Marital status: Single   Tobacco Use    Smoking status: Never     Passive exposure: Past    Smokeless tobacco: Never   Vaping Use    Vaping status: Never Used   Substance and Sexual Activity    Alcohol use: Never    Drug use: Never    Sexual activity: Yes     Partners: Male     Birth control/protection: None       Tobacco use counseling/intervention: patient does not use tobacco. Problem List:  Patient Active Problem List   Diagnosis    Migraine headache    Hypertrophy of both inferior nasal turbinates    Deviated nasal septum    Anxiety    Seasonal allergic rhinitis    Peripheral venous insufficiency    Recurrent acute sinusitis    Palpitations    Abnormal Holter exam     Allergy:   Allergies   Allergen Reactions    Macrobid [Nitrofurantoin] Other (See Comments)    Penicillins Other (See Comments)    Sulfa Antibiotics Other (See Comments)        Discontinued Medications:  There are no discontinued medications.    Current Medications:   Current Outpatient Medications   Medication Sig Dispense Refill    albuterol sulfate  (90 Base) MCG/ACT inhaler Inhale 2 puffs Every 4 (Four) Hours As Needed for Wheezing or Shortness of Air for up to 30 days. 18 g 0     amphetamine-dextroamphetamine (Adderall) 10 MG tablet Take 1 tablet by mouth Daily for 30 days. Take one tablet by mouth in the afternoon booster dose. 30 tablet 0    amphetamine-dextroamphetamine XR (Adderall XR) 20 MG 24 hr capsule Take 1 capsule by mouth Every Morning for 30 days 30 capsule 0    cefdinir (OMNICEF) 300 MG capsule Take 1 capsule by mouth Every 12 (Twelve) Hours.      cetirizine (zyrTEC) 10 MG tablet Zyrtec 10 mg oral tablet take 1 tablet (10 mg) by oral route once daily   Active      fluconazole (DIFLUCAN) 150 MG tablet TAKE 1 TABLET BY MOUTH 1 TIME. REPEAT DOSE IN 72 HOURS AS NEEDED      FLUoxetine (PROzac) 40 MG capsule       fluticasone (FLONASE) 50 MCG/ACT nasal spray 2 sprays into the nostril(s) as directed by provider Daily. 11.1 mL 0    folic acid (FOLVITE) 1 MG tablet TAKE 1 TABLET BY MOUTH EVERY DAY 30 tablet 0    propranolol (INDERAL) 20 MG tablet TAKE 0.5 - 1 TABLET BY MOUTH TWICE DAILY      vitamin D (ERGOCALCIFEROL) 1.25 MG (21045 UT) capsule capsule Take 1 capsule by mouth 1 (One) Time Per Week. 5 capsule 2    brompheniramine-pseudoephedrine-DM 30-2-10 MG/5ML syrup Take 5 mL by mouth 4 (Four) Times a Day As Needed for Congestion or Cough for up to 10 days. (Patient not taking: Reported on 4/7/2025) 118 mL 0    FLUoxetine (PROzac) 10 MG capsule Take 1 capsule by mouth Daily for 90 days. Take with current 40 mg po dose to equal 50 mg po q day. 30 capsule 2    Semaglutide-Weight Management 2.4 MG/0.75ML solution auto-injector Inject 2.4 mg under the skin into the appropriate area as directed 1 (One) Time Per Week. (Patient not taking: Reported on 4/7/2025) 3 mL 2     No current facility-administered medications for this visit.     Past Medical History:  Past Medical History:   Diagnosis Date    ADHD (attention deficit hyperactivity disorder) April 2024    Allergic     PCN, Sulfa    Allergic rhinitis     Anxiety     Bipolar disorder April 2024    Bulimia nervosa started in high school     "2012    Chronic migraine     Cluster headache     Depression     Deviated nasal septum     Disease of thyroid gland Graves Disease        Headache, tension-type     Hyperthyroidism     Graves Diseases     Hypertrophy of both inferior nasal turbinates     IUD (intrauterine device) in place     Myofascial muscle pain     PTSD (post-traumatic stress disorder)     Complex ptsd    Recurrent acute sinusitis     Suicide attempt 10/15/2015    Tendonitis of ankle     TMJ dysfunction      Past Surgical History:  Past Surgical History:   Procedure Laterality Date    ABDOMINAL SURGERY  2021         SECTION      GANGLION CYST EXCISION         MENTAL STATUS EXAM   General Appearance:  Cleanly groomed and dressed and well developed  Eye Contact:  Good eye contact  Attitude:  Cooperative, polite and candid  Motor Activity:  Fidgety and normal gait, posture  Muscle Strength:  Normal  Speech:  Normal rate, tone, volume  Language:  Spontaneous  Mood and affect:  Normal, pleasant  Thought Process:  Logical and goal-directed  Associations/ Thought Content:  No delusions  Hallucinations:  None  Suicidal Ideations:  Not present  Homicidal Ideation:  Not present  Sensorium:  Alert and clear  Orientation:  Person, place, time and situation  Immediate Recall, Recent, and Remote Memory:  Intact  Attention Span/ Concentration:  Good  Fund of Knowledge:  Appropriate for age and educational level  Intellectual Functioning:  Average range  Insight:  Good  Judgement:  Good  Reliability:  Good  Impulse Control:  Good      Vital Signs:   /62   Pulse 70   Ht 167.6 cm (66\")   Wt 73.4 kg (161 lb 12.8 oz)   BMI 26.12 kg/m²    Lab Results:   Admission on 2025, Discharged on 2025   Component Date Value Ref Range Status    SARS Antigen 2025 Not Detected  Not Detected, Presumptive Negative Final    Influenza A Antigen KELSEY 2025 Not Detected  Not Detected Final    Influenza B Antigen KELSEY " 03/30/2025 Not Detected  Not Detected Final    Internal Control 03/30/2025 Passed  Passed Final    Lot Number 03/30/2025 [id number]   Final    Expiration Date 03/30/2025 11,212,025   Final    Rapid Strep A Screen 03/30/2025 Negative  Negative, VALID, INVALID, Not Performed Final    Internal Control 03/30/2025 Passed  Passed Final    Lot Number 03/30/2025 [id number]   Final    Expiration Date 03/30/2025 3/8/2027   Final   Admission on 01/11/2025, Discharged on 01/11/2025   Component Date Value Ref Range Status    SARS Antigen 01/11/2025 Not Detected  Not Detected, Presumptive Negative Final    Influenza A Antigen KELSEY 01/11/2025 Not Detected  Not Detected Final    Influenza B Antigen KELSEY 01/11/2025 Not Detected  Not Detected Final    Internal Control 01/11/2025 Passed  Passed Final    Lot Number 01/11/2025 [id number]   Final    Expiration Date 01/11/2025 10/18/2025   Final    Rapid Strep A Screen 01/11/2025 Negative   Final    Internal Control 01/11/2025 Passed   Final    Lot Number 01/11/2025 [id number]   Final    Expiration Date 01/11/2025 12/11/2026   Final   Hospital Outpatient Visit on 01/02/2025   Component Date Value Ref Range Status    Case Report 01/02/2025    Final                    Value:Medical Cytology Report                           Case: [id number]                                  Authorizing Provider:  Renato Gage MD      Collected:           01/02/2025 11:06 AM          Ordering Location:     [facility]   Received:            01/03/2025 09:33 AM          Pathologist:           Laureen Nguyen MD                                                     Specimen:    Thyroid, Left; 10:53                                                                       Final Diagnosis 01/02/2025    Final                    Value:Thyroid gland, left lobe, FNA:   - Colloid nodule ([place] category II: Benign)        Clinical Information 01/02/2025    Final                    Value:Thyroid nodule.       Gross Description 01/02/2025    Final                    Value:1. Thyroid.  Fine Needle Aspiration, thyroid, left lobe       40 cc total volume in cytofixative received, 8 prepared smears received in ETOH, and one vial with an additional pass collected and held for possible reflex testing (1 cytospin prep prepared in addition to the 8 prepared smears received).          Microscopic Description 01/02/2025    Final                    Value:Microscopic examination performed.         ASSESSMENT AND PLAN:    ICD-10-CM ICD-9-CM   1. Mood disorder  F39 296.90   2. Adult ADHD  F90.9 314.01   3. Insomnia, psychophysiological  F51.04 307.42       Christin is a 29 y.o. female who presents today for follow-up regarding medication check. We have discussed the interval history and the treatment plan below, including potential R/B/SE of the recommended regimen of which the patient demonstrates understanding. Patient is agreeable to call 911 or go to the nearest ER should she become concerned for her own safety and/or the safety of those around her. There are are no overt indices of acute tara/psychosis on exam today. SEAN reviewed and is as expected.    Medication regimen: add 10 mg po fluoxetine to current 40 mg fluoxetine po q day dose-for daily dose of 50 mg po q day, continue Adderall XR 20 mg po q am, and continue Adderall IR 10 mg po q afternoon for booster; patient is advised not to misuse prescribed medications or to use them with any exogenous substances that aren't disclosed to this provider as they may interact with the regimen to the patient's detriment.   Risk Assessment: protracted risk is moderate, imminent risk is moderate.  Do note that this is subject to change with the Scientologist of new stressors, treatment non-adherence, use of substances, and/or new medical ails.   Monitoring: reviewed labs/imaging as populated above; ordered  Therapy: referred to therapy   Follow-up: may   Communications: N/A    TREATMENT  PLAN/GOALS: challenge patterns of living conducive to symptom burden, implement recommended regimen as above with augmentative, intermittent supportive psychotherapy to reduce symptom burden. Patient acknowledged and verbally consented to continue treatment. The importance of adherence to the recommended treatment and interval follow-up appointments was again emphasized today: patient has fair treatment adherence per given history. Patient was today reminded to limit daily caffeine intake, hydrate appropriately, eat healthy and nutritious foods, engage sleep hygiene measures, engage appropriate exposure to sunlight, engage with hobbies in balance with life necessities, and exercise appropriate to their capacity to do so.       Parts of this note are electronic transcriptions/translations of spoken language to printed text using the Dragon Dictation system.    Electronically signed by ANTONIA Simmons, 04/07/25

## 2025-04-13 DIAGNOSIS — F90.9 ADULT ADHD: ICD-10-CM

## 2025-04-14 RX ORDER — DEXTROAMPHETAMINE SACCHARATE, AMPHETAMINE ASPARTATE, DEXTROAMPHETAMINE SULFATE AND AMPHETAMINE SULFATE 2.5; 2.5; 2.5; 2.5 MG/1; MG/1; MG/1; MG/1
10 TABLET ORAL DAILY
Qty: 30 TABLET | Refills: 0 | Status: SHIPPED | OUTPATIENT
Start: 2025-04-20 | End: 2025-05-20

## 2025-04-14 NOTE — TELEPHONE ENCOUNTER
PT REQUEST REFILL:     amphetamine-dextroamphetamine (Adderall) 10 MG tablet (03/20/2025)     F/UP- 05/20/2025.  LOV: 04/07/2025.    LAST UDS:  Urine Drug Screen - Urine, Clean Catch (04/07/2025 16:20)

## 2025-04-15 DIAGNOSIS — E53.8 FOLIC ACID DEFICIENCY: ICD-10-CM

## 2025-04-16 RX ORDER — FOLIC ACID 1 MG/1
1000 TABLET ORAL DAILY
Qty: 30 TABLET | Refills: 0 | Status: SHIPPED | OUTPATIENT
Start: 2025-04-16

## 2025-04-22 ENCOUNTER — PATIENT MESSAGE (OUTPATIENT)
Dept: FAMILY MEDICINE CLINIC | Facility: CLINIC | Age: 30
End: 2025-04-22
Payer: COMMERCIAL

## 2025-04-22 DIAGNOSIS — E05.00 GRAVES DISEASE: ICD-10-CM

## 2025-04-22 DIAGNOSIS — E04.1 THYROID NODULE: ICD-10-CM

## 2025-04-22 DIAGNOSIS — R79.89 LOW SERUM THYROID STIMULATING HORMONE (TSH): Primary | ICD-10-CM

## 2025-04-23 DIAGNOSIS — F90.9 ADULT ADHD: ICD-10-CM

## 2025-04-23 RX ORDER — DEXTROAMPHETAMINE SACCHARATE, AMPHETAMINE ASPARTATE MONOHYDRATE, DEXTROAMPHETAMINE SULFATE AND AMPHETAMINE SULFATE 5; 5; 5; 5 MG/1; MG/1; MG/1; MG/1
20 CAPSULE, EXTENDED RELEASE ORAL EVERY MORNING
Qty: 30 CAPSULE | Refills: 0 | Status: SHIPPED | OUTPATIENT
Start: 2025-04-30 | End: 2025-05-30

## 2025-04-23 NOTE — TELEPHONE ENCOUNTER
REFILL REQUEST:     amphetamine-dextroamphetamine XR (Adderall XR) 20 MG 24 hr capsule (03/31/2025)     F/UP- 05/20/2025.  LOV: 04/07/2025.    LAST UDS:  Urine Drug Screen - Urine, Clean Catch (04/07/2025 16:20)

## 2025-05-08 ENCOUNTER — PATIENT ROUNDING (BHMG ONLY) (OUTPATIENT)
Dept: URGENT CARE | Facility: CLINIC | Age: 30
End: 2025-05-08
Payer: COMMERCIAL

## 2025-05-08 NOTE — ED NOTES
Thank you for letting us care for you in your recent visit to our urgent care center. We would love to hear about your experience with us. Was this the first time you have visited our location?    We're always looking for ways to make our patients' experiences even better. Do you have any recommendations on ways we may improve?     I appreciate you taking the time to respond. Please be on the lookout for a survey about your recent visit from [employer] via text or email. We would greatly appreciate if you could fill that out and turn it back in. We want your voice to be heard and we value your feedback.   Thank you for choosing [facility] for your healthcare needs.

## 2025-05-12 DIAGNOSIS — F90.9 ADULT ADHD: ICD-10-CM

## 2025-05-12 RX ORDER — DEXTROAMPHETAMINE SACCHARATE, AMPHETAMINE ASPARTATE, DEXTROAMPHETAMINE SULFATE AND AMPHETAMINE SULFATE 2.5; 2.5; 2.5; 2.5 MG/1; MG/1; MG/1; MG/1
10 TABLET ORAL DAILY
Qty: 30 TABLET | Refills: 0 | Status: SHIPPED | OUTPATIENT
Start: 2025-05-19 | End: 2025-06-18

## 2025-05-12 NOTE — TELEPHONE ENCOUNTER
REFILL REQUEST:    amphetamine-dextroamphetamine (Adderall) 10 MG tablet (04/20/2025)     F/UP: 05/19/2025.  LOV: 04/07/2025.    LAST UDS:  Urine Drug Screen - Urine, Clean Catch (04/07/2025 16:20)

## 2025-05-17 DIAGNOSIS — E53.8 FOLIC ACID DEFICIENCY: ICD-10-CM

## 2025-05-19 RX ORDER — FOLIC ACID 1 MG/1
1000 TABLET ORAL DAILY
Qty: 30 TABLET | Refills: 0 | Status: SHIPPED | OUTPATIENT
Start: 2025-05-19

## 2025-05-22 DIAGNOSIS — F90.9 ADULT ADHD: ICD-10-CM

## 2025-05-22 RX ORDER — DEXTROAMPHETAMINE SACCHARATE, AMPHETAMINE ASPARTATE MONOHYDRATE, DEXTROAMPHETAMINE SULFATE AND AMPHETAMINE SULFATE 5; 5; 5; 5 MG/1; MG/1; MG/1; MG/1
20 CAPSULE, EXTENDED RELEASE ORAL EVERY MORNING
Qty: 30 CAPSULE | Refills: 0 | Status: SHIPPED | OUTPATIENT
Start: 2025-05-29 | End: 2025-06-28

## 2025-05-22 NOTE — TELEPHONE ENCOUNTER
CONTROLLED MEDICATION REFILL REQUEST    STATE REGULATION APPT EVERY 3 MONTHS     UDS(URINE DRUG SCREEN) EVERY 6 MONTHS OR UP TO PROVIDER PREFERENCE   (LANG GLASGOW & EMILIANA 1 PER YEAR)     NEW NARC CONSENT EVERY YEAR      MEDICATION: amphetamine-dextroamphetamine XR (Adderall XR) 20 MG 24 hr capsule (04/30/2025)  amphetamine-dextroamphetamine (Adderall) 10 MG tablet (05/19/2025)     NEXT OFFICE VISIT: Appointment with Olga Jiang APRN (06/09/2025)     LAST OFFICE VISIT: Office Visit with Olga Jiang APRN (04/07/2025)     NARC CONSENT: CONTROLLED SUBSTANCE AGREEMENT - SCAN - [facility], , [phone] (04/11/2025)     URINE DRUG SCREEN(STANDING ORDER)   (LANG GLASGOW & EMILIANA 1 PER YEAR): Urine Drug Screen - Urine, Clean Catch (04/07/2025 16:20)  Fentanyl, Urine - Urine, Clean Catch (04/07/2025 16:20)    PROVIDER PLEASE ADVISE

## 2025-05-28 ENCOUNTER — OFFICE VISIT (OUTPATIENT)
Dept: FAMILY MEDICINE CLINIC | Facility: CLINIC | Age: 30
End: 2025-05-28
Payer: COMMERCIAL

## 2025-05-28 VITALS
WEIGHT: 170.6 LBS | BODY MASS INDEX: 27.54 KG/M2 | TEMPERATURE: 98 F | OXYGEN SATURATION: 99 % | DIASTOLIC BLOOD PRESSURE: 72 MMHG | SYSTOLIC BLOOD PRESSURE: 116 MMHG | HEART RATE: 105 BPM

## 2025-05-28 DIAGNOSIS — R68.83 CHILLS: ICD-10-CM

## 2025-05-28 DIAGNOSIS — R19.7 DIARRHEA, UNSPECIFIED TYPE: ICD-10-CM

## 2025-05-28 DIAGNOSIS — J10.1 INFLUENZA B: ICD-10-CM

## 2025-05-28 DIAGNOSIS — R05.1 ACUTE COUGH: Primary | ICD-10-CM

## 2025-05-28 LAB
EXPIRATION DATE: ABNORMAL
FLUAV AG UPPER RESP QL IA.RAPID: NOT DETECTED
FLUBV AG UPPER RESP QL IA.RAPID: DETECTED
INTERNAL CONTROL: ABNORMAL
Lab: ABNORMAL
SARS-COV-2 AG UPPER RESP QL IA.RAPID: NOT DETECTED

## 2025-05-28 PROCEDURE — 1159F MED LIST DOCD IN RCRD: CPT | Performed by: FAMILY MEDICINE

## 2025-05-28 PROCEDURE — 99213 OFFICE O/P EST LOW 20 MIN: CPT | Performed by: FAMILY MEDICINE

## 2025-05-28 PROCEDURE — 1160F RVW MEDS BY RX/DR IN RCRD: CPT | Performed by: FAMILY MEDICINE

## 2025-05-28 PROCEDURE — 87428 SARSCOV & INF VIR A&B AG IA: CPT | Performed by: FAMILY MEDICINE

## 2025-05-28 PROCEDURE — 1126F AMNT PAIN NOTED NONE PRSNT: CPT | Performed by: FAMILY MEDICINE

## 2025-05-28 RX ORDER — OSELTAMIVIR PHOSPHATE 75 MG/1
75 CAPSULE ORAL 2 TIMES DAILY
Qty: 10 CAPSULE | Refills: 0 | Status: SHIPPED | OUTPATIENT
Start: 2025-05-28 | End: 2025-06-02

## 2025-05-28 NOTE — PROGRESS NOTES
Chief Complaint  Nasal Congestion, Cough, Diarrhea (Started about one day ago), Headache, and Chills (Chills started today)    Subjective      Christin Morris is a 29 y.o. female who presents to [facility]     Nasal congestion, cough, diarrhea, headache, chills.  Most symptoms started yesterday but the chills started today. No fevers or dyspnea. Stuffy nose and postnasal drip as well.     Objective   Vital Signs:   Vitals:    05/28/25 0914   BP: 116/72   BP Location: Right arm   Patient Position: Sitting   Cuff Size: Adult   Pulse: 105   Temp: 98 °F (36.7 °C)   TempSrc: Infrared   SpO2: 99%   Weight: 77.4 kg (170 lb 9.6 oz)     Body mass index is 27.54 kg/m².    Wt Readings from Last 3 Encounters:   05/28/25 77.4 kg (170 lb 9.6 oz)   05/03/25 76.6 kg (168 lb 12.8 oz)   04/07/25 73.4 kg (161 lb 12.8 oz)     BP Readings from Last 3 Encounters:   05/28/25 116/72   05/03/25 118/77   04/07/25 111/62       Health Maintenance   Topic Date Due    ANNUAL PHYSICAL  06/25/2025    INFLUENZA VACCINE  07/01/2025    PAP SMEAR  02/19/2027    TDAP/TD VACCINES (3 - Td or Tdap) 11/16/2030    HEPATITIS C SCREENING  Completed    COVID-19 Vaccine  Completed    Pneumococcal Vaccine 0-49  Aged Out       Physical Exam  Vitals and nursing note reviewed.   Constitutional:       General: She is not in acute distress.     Appearance: Normal appearance.   HENT:      Head: Normocephalic and atraumatic.      Nose: Rhinorrhea present.      Mouth/Throat:      Pharynx: Posterior oropharyngeal erythema present. No oropharyngeal exudate.   Cardiovascular:      Rate and Rhythm: Normal rate and regular rhythm.      Heart sounds: No murmur heard.  Pulmonary:      Effort: Pulmonary effort is normal. No respiratory distress.      Breath sounds: Normal breath sounds. No wheezing.   Skin:     General: Skin is warm and dry.   Neurological:      Mental Status: She is alert.   Psychiatric:         Mood and Affect: Mood normal.          Behavior: Behavior normal.          Result Review :  The following data was reviewed by: Kev Domínguez MD on 05/28/2025:       Lab Results   Lab 05/28/25  0936   SARS ANTIGEN Not Detected      Lab Results   Lab 05/28/25 0936   INFLUENZA A ANTIGEN KELSEY Not Detected                Lab Results   Lab 05/28/25 0936   INFLUENZA B ANTIGEN KELSEY Detected*                                         Procedures          Assessment & Plan  Acute cough    Orders:    POCT SARS-CoV-2 Antigen KELSEY + Flu    Chills    Orders:    POCT SARS-CoV-2 Antigen KELSEY + Flu    Diarrhea, unspecified type    Orders:    POCT SARS-CoV-2 Antigen KELSEY + Flu    Influenza B  Positive for influenza.  Offered Tamiflu to patient.  Advised supportive measures such as hydration and rest. Can do OTC supportive medications such as an antihistamine or decongestant if needed. Honey can be soothing to a sore throat. Return if new or worsening symptoms.  Orders:    oseltamivir (Tamiflu) 75 MG capsule; Take 1 capsule by mouth 2 (Two) Times a Day for 5 days.                   FOLLOW UP  Return if symptoms worsen or fail to improve.  Patient was given instructions and counseling regarding her condition or for health maintenance advice. Please see specific information pulled into the AVS if appropriate.       Kev Domínguez MD  05/28/25  09:43 EDT    CURRENT & DISCONTINUED MEDICATIONS  Current Outpatient Medications   Medication Instructions    amphetamine-dextroamphetamine (Adderall) 10 MG tablet 10 mg, Oral, Daily, Take one tablet by mouth in the afternoon booster dose.    [START ON 5/29/2025] amphetamine-dextroamphetamine XR (Adderall XR) 20 MG 24 hr capsule 20 mg, Oral, Every Morning    cetirizine (zyrTEC) 10 MG tablet Zyrtec 10 mg oral tablet take 1 tablet (10 mg) by oral route once daily   Active    FLUoxetine (PROzac) 40 MG capsule No dose, route, or frequency recorded.    FLUoxetine (PROZAC) 10 mg, Oral, Daily, Take with current 40 mg po dose to  equal 50 mg po q day.    fluticasone (FLONASE) 50 MCG/ACT nasal spray 2 sprays, Nasal, Daily    folic acid (FOLVITE) 1,000 mcg, Oral, Daily    oseltamivir (TAMIFLU) 75 mg, Oral, 2 Times Daily    propranolol (INDERAL) 20 MG tablet TAKE 0.5 - 1 TABLET BY MOUTH TWICE DAILY       There are no discontinued medications.

## 2025-06-09 ENCOUNTER — OFFICE VISIT (OUTPATIENT)
Dept: PSYCHIATRY | Facility: CLINIC | Age: 30
End: 2025-06-09
Payer: COMMERCIAL

## 2025-06-09 ENCOUNTER — RESULTS FOLLOW-UP (OUTPATIENT)
Dept: FAMILY MEDICINE CLINIC | Facility: CLINIC | Age: 30
End: 2025-06-09

## 2025-06-09 ENCOUNTER — OFFICE VISIT (OUTPATIENT)
Dept: FAMILY MEDICINE CLINIC | Facility: CLINIC | Age: 30
End: 2025-06-09
Payer: COMMERCIAL

## 2025-06-09 VITALS
DIASTOLIC BLOOD PRESSURE: 78 MMHG | BODY MASS INDEX: 26.36 KG/M2 | WEIGHT: 164 LBS | OXYGEN SATURATION: 98 % | TEMPERATURE: 97.1 F | HEIGHT: 66 IN | HEART RATE: 68 BPM | SYSTOLIC BLOOD PRESSURE: 124 MMHG

## 2025-06-09 VITALS
DIASTOLIC BLOOD PRESSURE: 68 MMHG | BODY MASS INDEX: 26.47 KG/M2 | SYSTOLIC BLOOD PRESSURE: 112 MMHG | HEART RATE: 93 BPM | HEIGHT: 66 IN

## 2025-06-09 DIAGNOSIS — R03.0 ELEVATED HEART RATE WITH ELEVATED BLOOD PRESSURE WITHOUT DIAGNOSIS OF HYPERTENSION: ICD-10-CM

## 2025-06-09 DIAGNOSIS — E53.8 FOLIC ACID DEFICIENCY: ICD-10-CM

## 2025-06-09 DIAGNOSIS — E55.9 VITAMIN D DEFICIENCY: ICD-10-CM

## 2025-06-09 DIAGNOSIS — R31.9 HEMATURIA, UNSPECIFIED TYPE: ICD-10-CM

## 2025-06-09 DIAGNOSIS — R79.89 LOW SERUM THYROID STIMULATING HORMONE (TSH): ICD-10-CM

## 2025-06-09 DIAGNOSIS — R07.89 FEELING OF CHEST TIGHTNESS: ICD-10-CM

## 2025-06-09 DIAGNOSIS — F51.04 INSOMNIA, PSYCHOPHYSIOLOGICAL: ICD-10-CM

## 2025-06-09 DIAGNOSIS — Z01.419 WELL WOMAN EXAM WITH ROUTINE GYNECOLOGICAL EXAM: Primary | ICD-10-CM

## 2025-06-09 DIAGNOSIS — R53.83 FATIGUE, UNSPECIFIED TYPE: ICD-10-CM

## 2025-06-09 DIAGNOSIS — F90.9 ADULT ADHD: Primary | ICD-10-CM

## 2025-06-09 DIAGNOSIS — F39 MOOD DISORDER: ICD-10-CM

## 2025-06-09 DIAGNOSIS — R00.2 PALPITATIONS: ICD-10-CM

## 2025-06-09 DIAGNOSIS — R00.9 ELEVATED HEART RATE WITH ELEVATED BLOOD PRESSURE WITHOUT DIAGNOSIS OF HYPERTENSION: ICD-10-CM

## 2025-06-09 LAB
25(OH)D3 SERPL-MCNC: 29.5 NG/ML (ref 30–100)
ALBUMIN SERPL-MCNC: 4.5 G/DL (ref 3.5–5.2)
ALBUMIN/GLOB SERPL: 1.6 G/DL
ALP SERPL-CCNC: 66 U/L (ref 39–117)
ALT SERPL W P-5'-P-CCNC: 14 U/L (ref 1–33)
ANION GAP SERPL CALCULATED.3IONS-SCNC: 11 MMOL/L (ref 5–15)
AST SERPL-CCNC: 17 U/L (ref 1–32)
BACTERIAL VAGINOSIS VAG-IMP: NEGATIVE
BILIRUB BLD-MCNC: NEGATIVE MG/DL
BILIRUB SERPL-MCNC: 0.7 MG/DL (ref 0–1.2)
BUN SERPL-MCNC: 10 MG/DL (ref 6–20)
BUN/CREAT SERPL: 11.5 (ref 7–25)
CALCIUM SPEC-SCNC: 9 MG/DL (ref 8.6–10.5)
CANDIDA DNA VAG QL NAA+PROBE: NOT DETECTED
CANDIDA DNA VAG QL NAA+PROBE: NOT DETECTED
CHLORIDE SERPL-SCNC: 101 MMOL/L (ref 98–107)
CLARITY, POC: CLEAR
CO2 SERPL-SCNC: 24 MMOL/L (ref 22–29)
COLOR UR: ABNORMAL
CREAT SERPL-MCNC: 0.87 MG/DL (ref 0.57–1)
DEPRECATED RDW RBC AUTO: 40.8 FL (ref 37–54)
EGFRCR SERPLBLD CKD-EPI 2021: 91.5 ML/MIN/1.73
ERYTHROCYTE [DISTWIDTH] IN BLOOD BY AUTOMATED COUNT: 11.8 % (ref 12.3–15.4)
EXPIRATION DATE: ABNORMAL
FERRITIN SERPL-MCNC: 65.1 NG/ML (ref 13–150)
FOLATE SERPL-MCNC: 5.37 NG/ML (ref 4.78–24.2)
GLOBULIN UR ELPH-MCNC: 2.9 GM/DL
GLUCOSE SERPL-MCNC: 72 MG/DL (ref 65–99)
GLUCOSE UR STRIP-MCNC: NEGATIVE MG/DL
HCT VFR BLD AUTO: 46.4 % (ref 34–46.6)
HGB BLD-MCNC: 15.8 G/DL (ref 12–15.9)
IRON 24H UR-MRATE: 91 MCG/DL (ref 37–145)
IRON SATN MFR SERPL: 24 % (ref 20–50)
KETONES UR QL: NEGATIVE
LEUKOCYTE EST, POC: NEGATIVE
Lab: ABNORMAL
MAGNESIUM SERPL-MCNC: 2.1 MG/DL (ref 1.6–2.6)
MCH RBC QN AUTO: 31.9 PG (ref 26.6–33)
MCHC RBC AUTO-ENTMCNC: 34.1 G/DL (ref 31.5–35.7)
MCV RBC AUTO: 93.7 FL (ref 79–97)
NITRITE UR-MCNC: NEGATIVE MG/ML
PH UR: 6.5 [PH] (ref 5–8)
PLATELET # BLD AUTO: 289 10*3/MM3 (ref 140–450)
PMV BLD AUTO: 11.1 FL (ref 6–12)
POTASSIUM SERPL-SCNC: 3.4 MMOL/L (ref 3.5–5.2)
PROT SERPL-MCNC: 7.4 G/DL (ref 6–8.5)
PROT UR STRIP-MCNC: ABNORMAL MG/DL
RBC # BLD AUTO: 4.95 10*6/MM3 (ref 3.77–5.28)
RBC # UR STRIP: ABNORMAL /UL
SODIUM SERPL-SCNC: 136 MMOL/L (ref 136–145)
SP GR UR: 1.01 (ref 1–1.03)
T VAGINALIS DNA VAG QL NAA+PROBE: NOT DETECTED
T4 FREE SERPL-MCNC: 1.84 NG/DL (ref 0.92–1.68)
TIBC SERPL-MCNC: 386 MCG/DL (ref 298–536)
TRANSFERRIN SERPL-MCNC: 259 MG/DL (ref 200–360)
TSH SERPL DL<=0.05 MIU/L-ACNC: 0.24 UIU/ML (ref 0.27–4.2)
UROBILINOGEN UR QL: ABNORMAL
VIT B12 BLD-MCNC: 643 PG/ML (ref 211–946)
WBC NRBC COR # BLD AUTO: 7.94 10*3/MM3 (ref 3.4–10.8)

## 2025-06-09 PROCEDURE — G0123 SCREEN CERV/VAG THIN LAYER: HCPCS | Performed by: NURSE PRACTITIONER

## 2025-06-09 PROCEDURE — 84439 ASSAY OF FREE THYROXINE: CPT | Performed by: NURSE PRACTITIONER

## 2025-06-09 PROCEDURE — 83735 ASSAY OF MAGNESIUM: CPT | Performed by: NURSE PRACTITIONER

## 2025-06-09 PROCEDURE — 82306 VITAMIN D 25 HYDROXY: CPT | Performed by: NURSE PRACTITIONER

## 2025-06-09 PROCEDURE — 80053 COMPREHEN METABOLIC PANEL: CPT | Performed by: NURSE PRACTITIONER

## 2025-06-09 PROCEDURE — 84443 ASSAY THYROID STIM HORMONE: CPT | Performed by: NURSE PRACTITIONER

## 2025-06-09 PROCEDURE — 81515 NFCT DS BV&VAGINITIS DNA ALG: CPT | Performed by: NURSE PRACTITIONER

## 2025-06-09 PROCEDURE — 83540 ASSAY OF IRON: CPT | Performed by: NURSE PRACTITIONER

## 2025-06-09 PROCEDURE — 82728 ASSAY OF FERRITIN: CPT | Performed by: NURSE PRACTITIONER

## 2025-06-09 PROCEDURE — 82607 VITAMIN B-12: CPT | Performed by: NURSE PRACTITIONER

## 2025-06-09 PROCEDURE — 84466 ASSAY OF TRANSFERRIN: CPT | Performed by: NURSE PRACTITIONER

## 2025-06-09 PROCEDURE — 85027 COMPLETE CBC AUTOMATED: CPT | Performed by: NURSE PRACTITIONER

## 2025-06-09 PROCEDURE — 87624 HPV HI-RISK TYP POOLED RSLT: CPT | Performed by: NURSE PRACTITIONER

## 2025-06-09 PROCEDURE — 82746 ASSAY OF FOLIC ACID SERUM: CPT | Performed by: NURSE PRACTITIONER

## 2025-06-09 PROCEDURE — 87086 URINE CULTURE/COLONY COUNT: CPT | Performed by: NURSE PRACTITIONER

## 2025-06-09 RX ORDER — DEXTROAMPHETAMINE SACCHARATE, AMPHETAMINE ASPARTATE, DEXTROAMPHETAMINE SULFATE AND AMPHETAMINE SULFATE 2.5; 2.5; 2.5; 2.5 MG/1; MG/1; MG/1; MG/1
10 TABLET ORAL DAILY
Qty: 30 TABLET | Refills: 0 | Status: SHIPPED | OUTPATIENT
Start: 2025-06-18 | End: 2025-06-16 | Stop reason: SDUPTHER

## 2025-06-09 RX ORDER — DEXTROAMPHETAMINE SACCHARATE, AMPHETAMINE ASPARTATE MONOHYDRATE, DEXTROAMPHETAMINE SULFATE AND AMPHETAMINE SULFATE 5; 5; 5; 5 MG/1; MG/1; MG/1; MG/1
20 CAPSULE, EXTENDED RELEASE ORAL EVERY MORNING
Qty: 30 CAPSULE | Refills: 0 | Status: SHIPPED | OUTPATIENT
Start: 2025-06-28 | End: 2025-07-28

## 2025-06-09 RX ORDER — FOLIC ACID 1 MG/1
1000 TABLET ORAL DAILY
Qty: 30 TABLET | Refills: 11 | Status: SHIPPED | OUTPATIENT
Start: 2025-06-09

## 2025-06-09 NOTE — PROGRESS NOTES
Venipuncture Blood Specimen Collection  Venipuncture performed in left arm  by Katie Miller with good hemostasis. Patient tolerated the procedure well without complications.     06/09/25   Katie Miller

## 2025-06-09 NOTE — LETTER
Christin Morris  [address]  [place] [address]    June 9, 2025     Dear Ms. Morris:    Below are the results from your recent visit:  Christin the vaginosis panel was negative   Resulted Orders   POCT urinalysis dipstick, automated   Result Value Ref Range    Color Dark Yellow Yellow, Straw, Dark Yellow, Sakshi    Clarity, UA Clear Clear    Specific Gravity  1.015 1.005 - 1.030    pH, Urine 6.5 5.0 - 8.0    Leukocytes Negative Negative    Nitrite, UA Negative Negative    Protein, POC 30 mg/dL (A) Negative mg/dL    Glucose, UA Negative Negative mg/dL    Ketones, UA Negative Negative    Urobilinogen, UA 0.2 E.U./dL Normal, 0.2 E.U./dL    Bilirubin Negative Negative    Blood, UA Large (A) Negative    Lot Number [id number]     Expiration Date 01/24/2027    MVP Vaginosis Panel - Swab, Vagina   Result Value Ref Range    Bacterial vaginosis Negative Negative    Candida glabrata/krusei Not Detected Not Detected    CANDIDA GROUP Not Detected Not Detected      Comment:      Candida group contains one or more of the following: C. albicans, C. tropicalis, C. parapsilosis or C. dubliniensis.    Trichomonas vaginalis Not Detected Not Detected          Sincerely,        ANTONIA Vazquez

## 2025-06-09 NOTE — PROGRESS NOTES
Chief Complaint  Gynecologic Exam (Yearly pap ) and Irregular Heart Beat (Yesterday all day her heart was getting high then dropping low and she had chest tightness. )    Jolanta Morris presents to [facility]  History of Present Illness  Patient is actually scheduled today for her well woman exam and Pap smear    But she also reports that she has been experiencing some chest tightness with tachycardia/elevated heart rate    And in the past was evaluated by cardiology Dr. Farr  in 2023 during a visit Holter monitor that showed sinus tach and then also the echo and stress test    And then sees mental health provider for her mental health needs and medications  Gynecologic Exam  The patient's pertinent negatives include no vaginal discharge. Pertinent negatives include no abdominal pain, back pain or dysuria.       History of Present Illness  The patient is a 29-year-old female who presents today for her annual wellness visit and Pap smear. Additionally, she reports experiencing chest tightness and an elevated heart rate (tachycardia). She was previously evaluated by Dr. Santoyo, a cardiologist, in 2023. Orthostatic vital signs and an EKG will be obtained during this visit.    She experienced a challenging day yesterday, marked by palpitations, chest tightness, dizziness, lightheadedness, and blurred vision. These symptoms were concurrent. Her heart rate escalated to 132 upon standing, as recorded by her Apple watch. She found relief only when lying down. She reports no syncope, dysphagia, abdominal pain, hematochezia, polydipsia, polyphagia, polyuria, galactorrhea, breast lumps or pain, dysuria, suicidal ideation, self-injurious behavior, back or neck pain, or vaginal discharge. She does not use oral contraceptives and attempts self-breast examinations. She is uncertain about easy bruising.    She has a history of vitamin D deficiency and experiences  significant fatigue. Her menstrual cycles are typically normal, with the first day being slightly heavy but tapering off by the second day. She is scheduled to see a new endocrinologist on Wednesday. She is not experiencing any current symptoms of bacterial vaginosis but consents to a swab for reassurance.    GYNECOLOGICAL HISTORY:  - Last Menstrual Period: Ended 2025  - Frequency and Flow: Typically normal, first day slightly heavy      PHQ-2 Total Score: 0    PHQ-9 Total Score:        Past Medical History:   Diagnosis Date    ADHD (attention deficit hyperactivity disorder) 2024    Allergic     PCN, Sulfa    Allergic rhinitis     Anxiety     Bipolar disorder 2024    Bulimia nervosa started in high school        Chronic migraine     Cluster headache     Depression     Deviated nasal septum     Disease of thyroid gland Graves Disease        Headache, tension-type     Hyperthyroidism     Graves Diseases     Hypertrophy of both inferior nasal turbinates     IUD (intrauterine device) in place     Myofascial muscle pain     PTSD (post-traumatic stress disorder)     Complex ptsd    Recurrent acute sinusitis     Suicide attempt 10/15/2015    Tendonitis of ankle     TMJ dysfunction        Allergies   Allergen Reactions    Macrobid [Nitrofurantoin] Other (See Comments)    Penicillins Other (See Comments)    Sulfa Antibiotics Other (See Comments)        Past Surgical History:   Procedure Laterality Date    ABDOMINAL SURGERY  2021         SECTION      GANGLION CYST EXCISION          Social History     Tobacco Use    Smoking status: Never     Passive exposure: Past    Smokeless tobacco: Never   Vaping Use    Vaping status: Never Used   Substance Use Topics    Alcohol use: Never    Drug use: Never       Family History   Problem Relation Age of Onset    Heart disease Mother     Diabetes Mother     ADD / ADHD Mother     Anxiety disorder Mother     Bipolar disorder Mother      Depression Mother     Drug abuse Mother     Seizures Mother     Suicide Attempts Mother     Hypertension Mother     Migraines Mother     Mental illness Mother     Heart disease Father     Diabetes Father     Alcohol abuse Father     Anxiety disorder Father     Depression Father     Hypertension Father     Asthma Father     Hyperlipidemia Father     Arthritis Father     COPD Father     No Known Problems Sister     No Known Problems Brother     No Known Problems Maternal Aunt     No Known Problems Paternal Aunt     No Known Problems Maternal Uncle     No Known Problems Paternal Uncle     Alcohol abuse Maternal Grandfather     Heart disease Maternal Grandmother     Thyroid disease Maternal Grandmother     Dementia Maternal Grandmother     No Known Problems Paternal Grandfather     Stroke Paternal Grandmother     Heart disease Paternal Grandmother     Diabetes Paternal Grandmother     Anxiety disorder Paternal Grandmother     Dementia Paternal Grandmother     No Known Problems Cousin     OCD Neg Hx     Paranoid behavior Neg Hx     Schizophrenia Neg Hx     Self-Injurious Behavior  Neg Hx         There are no preventive care reminders to display for this patient.     Current Outpatient Medications on File Prior to Visit   Medication Sig    amphetamine-dextroamphetamine (Adderall) 10 MG tablet Take 1 tablet by mouth Daily for 30 days. Take one tablet by mouth in the afternoon booster dose.    amphetamine-dextroamphetamine XR (Adderall XR) 20 MG 24 hr capsule Take 1 capsule by mouth Every Morning for 30 days    cetirizine (zyrTEC) 10 MG tablet Zyrtec 10 mg oral tablet take 1 tablet (10 mg) by oral route once daily   Active    FLUoxetine (PROzac) 10 MG capsule Take 1 capsule by mouth Daily for 90 days. Take with current 40 mg po dose to equal 50 mg po q day.    FLUoxetine (PROzac) 40 MG capsule     fluticasone (FLONASE) 50 MCG/ACT nasal spray 2 sprays into the nostril(s) as directed by provider Daily.    propranolol  "(INDERAL) 20 MG tablet TAKE 0.5 - 1 TABLET BY MOUTH TWICE DAILY    [DISCONTINUED] folic acid (FOLVITE) 1 MG tablet TAKE 1 TABLET BY MOUTH EVERY DAY     No current facility-administered medications on file prior to visit.       Immunization History   Administered Date(s) Administered    COVID-19 (MODERNA) 1st,2nd,3rd Dose Monovalent 05/17/2021, 06/14/2021    COVID-19 (UNSPECIFIED) 03/12/2020, 01/01/2025    DTaP, Unspecified 05/28/1998    Flu Vaccine Quad PF >36MO 09/30/2023    Flublok 18+yrs 09/30/2023    Fluzone  >6mos 09/23/2024    Fluzone (or Fluarix & Flulaval for VFC) >6mos 09/29/2016, 11/16/2020    MMR 05/28/1998    OPV 05/28/1998    Tdap 05/12/2015, 11/16/2020       Review of Systems   Constitutional:  Positive for fatigue.   HENT:  Negative for trouble swallowing.    Eyes:  Positive for blurred vision. Negative for double vision.   Respiratory:  Positive for chest tightness.    Cardiovascular:  Positive for palpitations.   Gastrointestinal:  Negative for abdominal pain and blood in stool.   Endocrine: Negative for polydipsia, polyphagia and polyuria.   Genitourinary:  Negative for breast discharge, breast lump, breast pain, dysuria and vaginal discharge.   Musculoskeletal:  Negative for back pain and neck pain.   Neurological:  Positive for dizziness and light-headedness. Negative for seizures and syncope.   Hematological:  Bruises/bleeds easily.   Psychiatric/Behavioral:  Negative for self-injury and suicidal ideas.         Objective     /78   Pulse 68   Temp 97.1 °F (36.2 °C) (Temporal)   Ht 167.6 cm (66\")   Wt 74.4 kg (164 lb)   SpO2 98%   BMI 26.47 kg/m²       Physical Exam  Vitals and nursing note reviewed. Chaperone present: chaperone offered and pt declined.   Constitutional:       Appearance: Normal appearance.   HENT:      Head: Normocephalic.      Right Ear: External ear normal.      Left Ear: External ear normal.      Nose: Nose normal.      Mouth/Throat:      Mouth: Mucous membranes " are moist.   Eyes:      Pupils: Pupils are equal, round, and reactive to light.   Cardiovascular:      Rate and Rhythm: Normal rate and regular rhythm.      Heart sounds: Normal heart sounds.   Pulmonary:      Effort: Pulmonary effort is normal.      Breath sounds: Normal breath sounds.   Chest:   Breasts:     Flakito Score is 5.      Right: Normal. No swelling, bleeding, inverted nipple, mass, nipple discharge, skin change or tenderness.      Left: Normal. No swelling, bleeding, inverted nipple, mass, nipple discharge, skin change or tenderness.   Abdominal:      Palpations: Abdomen is soft.      Tenderness: There is no abdominal tenderness.      Hernia: There is no hernia in the left inguinal area or right inguinal area.   Genitourinary:     General: Normal vulva.      Exam position: Lithotomy position.      Pubic Area: No rash or pubic lice.       Flakito stage (genital): 5.      Labia:         Right: No rash, tenderness, lesion or injury.         Left: No rash, tenderness, lesion or injury.       Urethra: No prolapse, urethral pain, urethral swelling or urethral lesion.      Vagina: No signs of injury and foreign body. No vaginal discharge, erythema, tenderness, bleeding, lesions or prolapsed vaginal walls.      Cervix: Cervical bleeding present. No cervical motion tenderness, discharge, friability, lesion, erythema or eversion.      Uterus: Not deviated, not enlarged, not fixed, not tender and no uterine prolapse.       Adnexa:         Right: No mass, tenderness or fullness.          Left: No mass, tenderness or fullness.        Comments: Pap smear obtained and bacterial vaginosis swab obtained and just minute amounts of old blood from her period ending yesterday  Musculoskeletal:         General: Normal range of motion.      Cervical back: Normal range of motion and neck supple.   Lymphadenopathy:      Upper Body:      Right upper body: No supraclavicular or axillary adenopathy.      Left upper body: No  supraclavicular or axillary adenopathy.      Lower Body: No right inguinal adenopathy. No left inguinal adenopathy.   Skin:     General: Skin is warm and dry.   Neurological:      Mental Status: She is alert and oriented to person, place, and time.   Psychiatric:         Mood and Affect: Mood normal.         Behavior: Behavior normal.         Thought Content: Thought content normal.         Judgment: Judgment normal.         Result Review :     The following data was reviewed by: ANTONIA Vazquez on 2025:           ECG 12 Lead (2025 11:04)  Treadmill Stress Test (2023 14:50)  Adult Transthoracic Echo Complete W/ Cont if Necessary Per Protocol (2023 14:03)  Office Visit with Herminia Farr MD (2023)   SCANNED - HOLTER MONITOR (2022)   SCANNED EKG (2023)   Results  Labs   - Urinalysis: Blood in urine    Diagnostic Testing   - [id number], Normal        ECG 12 Lead    Date/Time: 2025 11:11 AM  Performed by: Delfina Mansfield APRN    Authorized by: Delfina Mansfield APRN  Comparison: compared with previous ECG from 1/3/2023  Similar to previous ECG  Rhythm: sinus rhythm  Rate: normal  Conduction: conduction normal  ST Segments: ST segments normal  T Waves: T waves normal  QRS axis: normal  Other: no other findings    Clinical impression: normal ECG              Assessment and Plan      Diagnoses and all orders for this visit:    1. Well woman exam with routine gynecological exam (Primary)  -     POCT urinalysis dipstick, automated  -     IgP, Aptima HPV  -     MVP Vaginosis Panel - Swab, Vagina    2. Feeling of chest tightness  Comments:  episode of  Orders:  -     ECG 12 Lead  -     Ambulatory Referral to Cardiology for Chest Pain  -     Magnesium    3. Elevated heart rate with elevated blood pressure without diagnosis of hypertension  Comments:  per pt report  Orders:  -     ECG 12 Lead  -     Ambulatory Referral to Cardiology for Chest Pain  -      Vitamin B12 & Folate  -     TSH+Free T4  -     CBC (No Diff)  -     Comprehensive Metabolic Panel  -     Ferritin  -     Iron Profile w/o Ferritin  -     Magnesium    4. Hematuria, unspecified type  -     Urine Culture - Urine, Urine, Clean Catch    5. Folic acid deficiency  -     folic acid (FOLVITE) 1 MG tablet; Take 1 tablet by mouth Daily.  Dispense: 30 tablet; Refill: 11  -     Vitamin B12 & Folate    6. Palpitations  -     folic acid (FOLVITE) 1 MG tablet; Take 1 tablet by mouth Daily.  Dispense: 30 tablet; Refill: 11  -     Vitamin B12 & Folate  -     Vitamin D,25-Hydroxy  -     TSH+Free T4  -     CBC (No Diff)  -     Comprehensive Metabolic Panel  -     Ferritin  -     Iron Profile w/o Ferritin    7. Low serum thyroid stimulating hormone (TSH)  -     Vitamin D,25-Hydroxy  -     TSH+Free T4    8. Fatigue, unspecified type  -     folic acid (FOLVITE) 1 MG tablet; Take 1 tablet by mouth Daily.  Dispense: 30 tablet; Refill: 11  -     Vitamin B12 & Folate  -     Vitamin D,25-Hydroxy  -     TSH+Free T4  -     CBC (No Diff)  -     Comprehensive Metabolic Panel  -     Ferritin  -     Iron Profile w/o Ferritin    9. Vitamin D deficiency  -     Vitamin D,25-Hydroxy      Well woman exam accomplished today with Pap smear and breast exam and bacterial vaginosis swab    Labs ordered as noted above    Referral back to Dr. Larios cardiology    Assessment & Plan  1. Tachycardia.  - Reported experiencing rapid heart rate and palpitations yesterday, with a heart rate jumping to 132 bpm upon standing. Also experienced dizziness, lightheadedness, and blurred vision during these episodes.  - EKG results were within normal limits today. Orthostatic vitals showed no significant drop in blood pressure upon standing, which does not indicate orthostatic hypotension.  - Referral to Dr. Saha, cardiology, has been initiated for further evaluation.  - A magnesium level test will be conducted to rule out any deficiencies  that might contribute to her symptoms. Advised to maintain adequate hydration.    2. Chest tightness.  - Reported experiencing chest tightness yesterday, which was alleviated by lying down.  - Symptom, along with her tachycardia, warrants further evaluation by cardiology.  - Referral to Dr. Santoyo has been initiated.    3. Vitamin D deficiency.  - Has a history of vitamin D deficiency.  - A vitamin D level test will be conducted to assess her current status.    4. Fatigue.  - Reports feeling fatigued.  - A thyroid function test and a complete blood count (CBC) will be conducted to investigate potential causes.    5. Hematuria.  - Trace blood was found in her urine sample.  - This could be related to her recent menstrual period, which ended yesterday.  - A urine culture will be sent out for further analysis.    6. Health maintenance.  - Scheduled to see a new endocrinologist on Wednesday.  - A thyroid level test will be conducted today so she can have the results for that appointment.  - Advised to perform self-breast examinations post-menstruation and to monitor for any changes such as puckering, dimpling, or nipple inversion.  - A Pap smear was performed today. A swab for bacterial vaginosis was also taken due to her history of the condition.    7. Medication management.  - Folic acid prescription has been refilled for a duration of one year.          Follow Up     Return if symptoms worsen or fail to improve.    Patient was given instructions and counseling regarding her condition or for health maintenance advice. Please see specific information pulled into the AVS if appropriate.     BMI is >= 25 and <30. (Overweight) The following options were offered after discussion;: exercise counseling/recommendations and nutrition counseling/recommendations      Christin Morris  reports that she has never smoked. She has been exposed to tobacco smoke. She has never used smokeless tobacco. I have educated her on the risk of  diseases from using tobacco products such as cancer, COPD, and heart disease.              Patient or patient representative verbalized consent for the use of Ambient Listening during the visit with  ANTONIA Vazquez for chart documentation. 6/9/2025  11:09 EDT

## 2025-06-09 NOTE — PROGRESS NOTES
"[facility]  Follow-up Visit    Chief Complaint: \"I would like to be screened for ADHD, Bipolar disorder, and other mental illnesses\"     History of Present Illness: Christin Morris is a 28 y.o. female who presents today for initial evaluation regarding consultation and screenings for BP, BPD, Adult ADHD, and anxiety.  Christin presents unaccompanied in no acute distress and engages with me appropriately. Psychotropic regimen with which patient presents is described as fluoxetine 40 mg +20 mg po =60 mg po daily.      History is positive for signs/symptoms suggestive of alternating symptoms of elevated (irritability) and depressed moods that do not conform to temporal parameters of bipolar disorder and are not sufficiently encapsulated by MDD. With regard to ADHD: I am presumptively treating patient for this issue; history as provided today, presentation today, distinct possibility this is a contributing factor to patient's dysfunction in  roles and engagements irrespective to screening results.   Patient has learned and successfully implemented compensatory coping skills that, with the Mormon of layered stressors throughout adulthood, have begun to fail. Treating ADHD symptoms will likely be a concise and appropriate route to address anxiety and mood issues that are, at least to some degree, secondary to deficits related to traits of ADHD.      ASRS-v1.1  Part A  6/6  Part B  12/12     Total  18/18            Patient (suspected)  history of early exposure to enduring trauma associated with re-experiencing trauma, avoidance, hyperarousal (PTSD) and difficulty managing emotions, negative self-view, relationship difficulties, dissociative symptoms, and demoralization (complex PTSD).   Psychiatric screening is negative for pathognomonic history of: violence, tara, TBI, PTSD.     I have counseled the patient with regard to diagnoses and the recommended treatment regimen as " documented below: I will assume prescriptive responsibility for probable stimulant for Adult ADHD.I will be likely prescribing amphetamine-dextroamphetamine XR 10 mg  for ADHD. Patient has been made aware of the long-term risks of tachyphylaxis/dependence and uncomfortable withdrawal that may occur with abrupt discontinuation of this agent. I have advised taking medication holidays to mitigate risk of dependence and tolerance and have advised the patient with regard to common psychological dependence that can contribute to perceived diminishment in treatment effectiveness. Patient has been advised to monitor and limit caffeine intake while taking this agent. Patient has been made aware of common SE - diminished appetite, insomnia, hypertension - for which this agent has propensity. I have specifically reviewed issues related to misuse and diversion with the patient today.  Patient acknowledges the diagnoses per my rendered interpretation. Patient demonstrates awareness/understanding of viable alternatives for treatment as well as potential risks, benefits, and side effects associated with this regimen and is amenable to proceed in this fashion.      Recommended lifestyle changes: 30 minutes of activity to increase HR 2-3 days weekly.     Psychiatric History:  Diagnoses: diagnosed by primary with depression and anxiety  Outpatient history: no therapy  Inpatient history: yes, on 72 hour hold, for OD trazodone  Medication trials: fluoxetine 60 mg po, paroxetine, and bupropion  Other treatment modalities: none  Self harm: No history  Suicide attempts: Yes  Abuse or neglect: emotional     Substance Abuse History:   Types/methods/frequency: *none  Transtheoretical stage: non     Social History:  Residence: lives house, with boyfriend, daughters, and 2 cats  Vocation: yes  Source of income: Employed  Last grade completed: college, Bachelors health administration  Pertinent developmental history: none  Pertinent legal  "history: No history   Hobbies/interests: plays with kids  Sikhism: N/A  Exercise:walking 20 minutes/day  5 days/week   Dietary habits: no pertinent issues   Sleep hygiene:  variable, hours and days with energy, days where is takes longer to get out of bed  Social habits: no pertinent issues   Sunlight: There are no concern for under-exposure.  Caffeine intake:  coffee  Hydration habits: no pertinent issues    history: No    Interval History Christin is a 29 y.o. female who presents today for follow-up. Christin presents unaccompanied in no acute distress and engages with me appropriately.   Current treatment regimen includes:   - fluoxetine 50 mg po q day  Adderall XR 20 mg po q am  Adderall 10 mg po q afternoon  Side-effects per given history: none.      Today the patient feels \"pretty good.\" She is recovering form influenza type B. She reports that she is tolerating the fluoxetine at 50 mg po daily better. She reports that she is looking at going out of town on the weekends.She recently took her 4 year old daughter to the [facility] for the first time to see Chasidy and Deandra. She reports she is on the wait list for [facility].  Thought process and content are devoid of overt aberration suggestive of acute tara/psychosis. The patient denies SI/HI/AVH. There are changes on exam today compared to most recent evaluation.    - sleep: no concerns, better  - appetite: moderately controlled    Plan to continue current regimen.   I have counseled the patient with regard to diagnoses and the recommended treatment regimen as documented below. Patient acknowledges the diagnoses per my rendered interpretation. Patient demonstrates understanding of potential risks/benefits/side effects associated with this regimen and is amenable to proceed in this fashion.       Social History     Socioeconomic History    Marital status: Single   Tobacco Use    Smoking status: Never     Passive exposure: Past    Smokeless tobacco: Never "   Vaping Use    Vaping status: Never Used   Substance and Sexual Activity    Alcohol use: Never    Drug use: Never    Sexual activity: Yes     Partners: Male     Birth control/protection: None       Tobacco use counseling/intervention: patient does not use tobacco. Currently Precontemplation by transtheoretical model.     Problem List:  Patient Active Problem List   Diagnosis    Migraine headache    Hypertrophy of both inferior nasal turbinates    Deviated nasal septum    Anxiety    Seasonal allergic rhinitis    Peripheral venous insufficiency    Recurrent acute sinusitis    Palpitations    Abnormal Holter exam    Graves disease    Thyroid nodule    Low serum thyroid stimulating hormone (TSH)     Allergy:   Allergies   Allergen Reactions    Macrobid [Nitrofurantoin] Other (See Comments)    Penicillins Other (See Comments)    Sulfa Antibiotics Other (See Comments)        Discontinued Medications:  There are no discontinued medications.    Current Medications:   Current Outpatient Medications   Medication Sig Dispense Refill    amphetamine-dextroamphetamine (Adderall) 10 MG tablet Take 1 tablet by mouth Daily for 30 days. Take one tablet by mouth in the afternoon booster dose. 30 tablet 0    amphetamine-dextroamphetamine XR (Adderall XR) 20 MG 24 hr capsule Take 1 capsule by mouth Every Morning for 30 days 30 capsule 0    cetirizine (zyrTEC) 10 MG tablet Zyrtec 10 mg oral tablet take 1 tablet (10 mg) by oral route once daily   Active      FLUoxetine (PROzac) 10 MG capsule Take 1 capsule by mouth Daily for 90 days. Take with current 40 mg po dose to equal 50 mg po q day. 30 capsule 2    FLUoxetine (PROzac) 40 MG capsule       fluticasone (FLONASE) 50 MCG/ACT nasal spray 2 sprays into the nostril(s) as directed by provider Daily. 11.1 mL 0    folic acid (FOLVITE) 1 MG tablet Take 1 tablet by mouth Daily. 30 tablet 11    propranolol (INDERAL) 20 MG tablet TAKE 0.5 - 1 TABLET BY MOUTH TWICE DAILY      SEMAGLUTIDE-WEIGHT  MANAGEMENT SC Inject  under the skin into the appropriate area as directed. 0.5MG PER INJECTION ONCE PER WEEK       No current facility-administered medications for this visit.     Past Medical History:  Past Medical History:   Diagnosis Date    ADHD (attention deficit hyperactivity disorder) 2024    Allergic     PCN, Sulfa    Allergic rhinitis     Anxiety     Bipolar disorder 2024    Bulimia nervosa started in high school        Chronic migraine     Cluster headache     Depression     Deviated nasal septum     Disease of thyroid gland Graves Disease        Headache, tension-type     Hyperthyroidism     Graves Diseases     Hypertrophy of both inferior nasal turbinates     IUD (intrauterine device) in place     Myofascial muscle pain     PTSD (post-traumatic stress disorder)     Complex ptsd    Recurrent acute sinusitis     Suicide attempt 10/15/2015    Tendonitis of ankle     TMJ dysfunction      Past Surgical History:  Past Surgical History:   Procedure Laterality Date    ABDOMINAL SURGERY  2021         SECTION      GANGLION CYST EXCISION         MENTAL STATUS EXAM   General Appearance:  Cleanly groomed and dressed and well developed  Eye Contact:  Good eye contact  Attitude:  Cooperative, polite and candid  Motor Activity:  Normal gait, posture  Muscle Strength:  Normal  Speech:  Normal rate, tone, volume  Language:  Spontaneous  Mood and affect:  Normal, pleasant  Thought Process:  Logical and goal-directed  Associations/ Thought Content:  No delusions  Hallucinations:  None  Suicidal Ideations:  Not present  Homicidal Ideation:  Not present  Sensorium:  Alert and clear  Orientation:  Person, place, time and situation  Immediate Recall, Recent, and Remote Memory:  Intact  Attention Span/ Concentration:  Good  Fund of Knowledge:  Appropriate for age and educational level  Intellectual Functioning:  Average range  Insight:  Good  Judgement:  Good  Reliability:   "Good  Impulse Control:  Good      Vital Signs:   /68   Pulse 93   Ht 167.6 cm (66\")   BMI 26.47 kg/m²    Lab Results:   Office Visit on 06/09/2025   Component Date Value Ref Range Status    Color 06/09/2025 Dark Yellow  Yellow, Straw, Dark Yellow, Sakshi Final    Clarity, UA 06/09/2025 Clear  Clear Final    Specific Gravity  06/09/2025 1.015  1.005 - 1.030 Final    pH, Urine 06/09/2025 6.5  5.0 - 8.0 Final    Leukocytes 06/09/2025 Negative  Negative Final    Nitrite, UA 06/09/2025 Negative  Negative Final    Protein, POC 06/09/2025 30 mg/dL (A)  Negative mg/dL Final    Glucose, UA 06/09/2025 Negative  Negative mg/dL Final    Ketones, UA 06/09/2025 Negative  Negative Final    Urobilinogen, UA 06/09/2025 0.2 E.U./dL  Normal, 0.2 E.U./dL Final    Bilirubin 06/09/2025 Negative  Negative Final    Blood, UA 06/09/2025 Large (A)  Negative Final    Lot Number 06/09/2025 [id number]   Final    Expiration Date 06/09/2025 01/24/2027   Final    Bacterial vaginosis 06/09/2025 Negative  Negative Final    Candida glabrata/krusei 06/09/2025 Not Detected  Not Detected Final    JENNA GROUP 06/09/2025 Not Detected  Not Detected Final    Candida group contains one or more of the following: C. albicans, C. tropicalis, C. parapsilosis or C. dubliniensis.    Trichomonas vaginalis 06/09/2025 Not Detected  Not Detected Final   Office Visit on 05/28/2025   Component Date Value Ref Range Status    SARS Antigen 05/28/2025 Not Detected  Not Detected, Presumptive Negative Final    Influenza A Antigen KELSEY 05/28/2025 Not Detected  Not Detected Final    Influenza B Antigen KELSEY 05/28/2025 Detected (A)  Not Detected Final    Internal Control 05/28/2025 Passed  Passed Final    Lot Number 05/28/2025 [id number]   Final    Expiration Date 05/28/2025 1-   Final   Lab on 04/07/2025   Component Date Value Ref Range Status    THC, Screen, Urine 04/07/2025 Negative  Negative Final    Phencyclidine (PCP), Urine 04/07/2025 Negative  Negative " Final    Cocaine Screen, Urine 04/07/2025 Negative  Negative Final    Methamphetamine, Ur 04/07/2025 Negative  Negative Final    Opiate Screen 04/07/2025 Negative  Negative Final    Amphetamine Screen, Urine 04/07/2025 Positive (A)  Negative Final    Benzodiazepine Screen, Urine 04/07/2025 Negative  Negative Final    Tricyclic Antidepressants Screen 04/07/2025 Negative  Negative Final    Methadone Screen, Urine 04/07/2025 Negative  Negative Final    Barbiturates Screen, Urine 04/07/2025 Negative  Negative Final    Oxycodone Screen, Urine 04/07/2025 Negative  Negative Final    Buprenorphine, Screen, Urine 04/07/2025 Negative  Negative Final    Fentanyl, Urine 04/07/2025 Negative  Negative Final   Admission on 03/30/2025, Discharged on 03/30/2025   Component Date Value Ref Range Status    SARS Antigen 03/30/2025 Not Detected  Not Detected, Presumptive Negative Final    Influenza A Antigen KELSEY 03/30/2025 Not Detected  Not Detected Final    Influenza B Antigen KELSEY 03/30/2025 Not Detected  Not Detected Final    Internal Control 03/30/2025 Passed  Passed Final    Lot Number 03/30/2025 [id number]   Final    Expiration Date 03/30/2025 11,212,025   Final    Rapid Strep A Screen 03/30/2025 Negative  Negative, VALID, INVALID, Not Performed Final    Internal Control 03/30/2025 Passed  Passed Final    Lot Number 03/30/2025 [id number]   Final    Expiration Date 03/30/2025 3/8/2027   Final   Admission on 01/11/2025, Discharged on 01/11/2025   Component Date Value Ref Range Status    SARS Antigen 01/11/2025 Not Detected  Not Detected, Presumptive Negative Final    Influenza A Antigen KELSEY 01/11/2025 Not Detected  Not Detected Final    Influenza B Antigen KELSEY 01/11/2025 Not Detected  Not Detected Final    Internal Control 01/11/2025 Passed  Passed Final    Lot Number 01/11/2025 [id number]   Final    Expiration Date 01/11/2025 10/18/2025   Final    Rapid Strep A Screen 01/11/2025 Negative   Final    Internal Control 01/11/2025 Passed    Final    Lot Number 01/11/2025 [id number]   Final    Expiration Date 01/11/2025 12/11/2026   Final   Hospital Outpatient Visit on 01/02/2025   Component Date Value Ref Range Status    Case Report 01/02/2025    Final                    Value:Medical Cytology Report                           Case: [id number]                                  Authorizing Provider:  Renato Gage MD      Collected:           01/02/2025 11:06 AM          Ordering Location:     [facility]   Received:            01/03/2025 09:33 AM          Pathologist:           Laureen Nguyen MD                                                     Specimen:    Thyroid, Left; 10:53                                                                       Final Diagnosis 01/02/2025    Final                    Value:Thyroid gland, left lobe, FNA:   - Colloid nodule ([place] category II: Benign)        Clinical Information 01/02/2025    Final                    Value:Thyroid nodule.      Gross Description 01/02/2025    Final                    Value:1. Thyroid.  Fine Needle Aspiration, thyroid, left lobe       40 cc total volume in cytofixative received, 8 prepared smears received in ETOH, and one vial with an additional pass collected and held for possible reflex testing (1 cytospin prep prepared in addition to the 8 prepared smears received).          Microscopic Description 01/02/2025    Final                    Value:Microscopic examination performed.         ASSESSMENT AND PLAN:    ICD-10-CM ICD-9-CM   1. Adult ADHD  F90.9 314.01   2. Mood disorder  F39 296.90   3. Insomnia, psychophysiological  F51.04 307.42       Christin is a 29 y.o. female who presents today for follow-up regarding medication . We have discussed the interval history and the treatment plan below, including potential R/B/SE of the recommended regimen of which the patient demonstrates understanding. Patient is agreeable to call 911 or go to the nearest ER should she  become concerned for her own safety and/or the safety of those around her. There are are no overt indices of acute tara/psychosis on exam today. SEAN reviewed and is as expected.    Medication regimen: continue Adderall XR 20 mg po q am, continue Adderall 10 mg po q afternoon continue fluoxetine 50 mg (10 mg+ 40 mg) ; patient is advised not to misuse prescribed medications or to use them with any exogenous substances that aren't disclosed to this provider as they may interact with the regimen to the patient's detriment.   Risk Assessment: protracted risk is moderate, imminent risk is moderate-low.  Do note that this is subject to change with the Religious of new stressors, treatment non-adherence, use of substances, and/or new medical ails.   Monitoring: reviewed labs/imaging as populated above; ordered  Therapy: on waiting  list for [facility]   Follow-up: 3 months   Communications: N/A    TREATMENT PLAN/GOALS: challenge patterns of living conducive to symptom burden, implement recommended regimen as above with augmentative, intermittent supportive psychotherapy to reduce symptom burden. Patient acknowledged and verbally consented to continue treatment. The importance of adherence to the recommended treatment and interval follow-up appointments was again emphasized today: patient has good treatment adherence per given history. Patient was today reminded to limit daily caffeine intake, hydrate appropriately, eat healthy and nutritious foods, engage sleep hygiene measures, engage appropriate exposure to sunlight, engage with hobbies in balance with life necessities, and exercise appropriate to their capacity to do so.       Parts of this note are electronic transcriptions/translations of spoken language to printed text using the Dragon Dictation system.    Electronically signed by ANTONIA Simmons, 06/09/25

## 2025-06-10 LAB — BACTERIA SPEC AEROBE CULT: NO GROWTH

## 2025-06-10 NOTE — PROGRESS NOTES
Please mail letter to patient stating     Christin the urine culture shows no bacterial growth

## 2025-06-10 NOTE — PROGRESS NOTES
Please mail letter to patient stating    Christin your thyroid level still show and reflect hyperthyroidism and I know that you are seeing an endocrinologist for this--been the vitamin D level was just borderline at 29.5 and normal is  and so it is only barely in the insufficient range and I would recommend taking 2000 to 4000 IUs daily of the vitamin D3 and you can get this over-the-counter    And then your comprehensive panel shows normal glucose and normal kidney and liver function test and then all of the electrolytes were normal with the exception of just a borderline potassium at 3.4 and normal is 3.5 I think your blood counts were all in good range and the vitamin B-12 and folic acid was normal and the ferritin level was normal and the entire iron panel was normal and the magnesium was also normal

## 2025-06-11 LAB
CYTOLOGIST CVX/VAG CYTO: NORMAL
CYTOLOGY CVX/VAG DOC CYTO: NORMAL
CYTOLOGY CVX/VAG DOC THIN PREP: NORMAL
DX ICD CODE: NORMAL
HPV I/H RISK 4 DNA CVX QL PROBE+SIG AMP: NEGATIVE
OTHER STN SPEC: NORMAL
SERVICE CMNT-IMP: NORMAL
STAT OF ADQ CVX/VAG CYTO-IMP: NORMAL

## 2025-06-11 NOTE — PROGRESS NOTES
Please mail letter to patient stating    Christin your Pap smear was read as negative for intraepithelial lesion or malignancy of the cervix and then negative for HPV

## 2025-06-16 DIAGNOSIS — F90.9 ADULT ADHD: ICD-10-CM

## 2025-06-16 DIAGNOSIS — F39 MOOD DISORDER: ICD-10-CM

## 2025-06-16 RX ORDER — DEXTROAMPHETAMINE SACCHARATE, AMPHETAMINE ASPARTATE, DEXTROAMPHETAMINE SULFATE AND AMPHETAMINE SULFATE 2.5; 2.5; 2.5; 2.5 MG/1; MG/1; MG/1; MG/1
10 TABLET ORAL DAILY
Qty: 30 TABLET | Refills: 0 | Status: SHIPPED | OUTPATIENT
Start: 2025-06-18 | End: 2025-06-16

## 2025-06-16 RX ORDER — DEXTROAMPHETAMINE SACCHARATE, AMPHETAMINE ASPARTATE, DEXTROAMPHETAMINE SULFATE AND AMPHETAMINE SULFATE 2.5; 2.5; 2.5; 2.5 MG/1; MG/1; MG/1; MG/1
10 TABLET ORAL DAILY
Qty: 30 TABLET | Refills: 0 | Status: SHIPPED | OUTPATIENT
Start: 2025-06-16 | End: 2025-07-16

## 2025-06-16 RX ORDER — FLUOXETINE 10 MG/1
10 CAPSULE ORAL DAILY
Qty: 90 CAPSULE | Refills: 1 | Status: SHIPPED | OUTPATIENT
Start: 2025-06-16 | End: 2025-12-13

## 2025-06-16 NOTE — TELEPHONE ENCOUNTER
FLUoxetine (PROzac) 10 MG capsule       Last ordered: 2 months ago (4/7/2025) by ANTONIA Simmons       FOLLOW UP 08/11/2025

## 2025-06-19 ENCOUNTER — OFFICE VISIT (OUTPATIENT)
Dept: FAMILY MEDICINE CLINIC | Facility: CLINIC | Age: 30
End: 2025-06-19
Payer: COMMERCIAL

## 2025-06-19 VITALS
DIASTOLIC BLOOD PRESSURE: 72 MMHG | SYSTOLIC BLOOD PRESSURE: 118 MMHG | HEIGHT: 66 IN | OXYGEN SATURATION: 97 % | WEIGHT: 163 LBS | HEART RATE: 93 BPM | BODY MASS INDEX: 26.2 KG/M2 | TEMPERATURE: 98.3 F

## 2025-06-19 DIAGNOSIS — J02.9 SORE THROAT: ICD-10-CM

## 2025-06-19 DIAGNOSIS — R52 BODY ACHES: ICD-10-CM

## 2025-06-19 DIAGNOSIS — J10.1 INFLUENZA B: Primary | ICD-10-CM

## 2025-06-19 LAB
EXPIRATION DATE: ABNORMAL
EXPIRATION DATE: NORMAL
FLUAV AG UPPER RESP QL IA.RAPID: NOT DETECTED
FLUBV AG UPPER RESP QL IA.RAPID: DETECTED
INTERNAL CONTROL: ABNORMAL
INTERNAL CONTROL: NORMAL
Lab: 386
Lab: ABNORMAL
S PYO AG THROAT QL: NEGATIVE
SARS-COV-2 AG UPPER RESP QL IA.RAPID: NOT DETECTED

## 2025-06-19 RX ORDER — OSELTAMIVIR PHOSPHATE 75 MG/1
75 CAPSULE ORAL 2 TIMES DAILY
Qty: 10 CAPSULE | Refills: 0 | Status: SHIPPED | OUTPATIENT
Start: 2025-06-19

## 2025-06-19 NOTE — PROGRESS NOTES
Chief Complaint  Sore Throat (Very sore throat, her taste is off, sweats and some body aches.  This started last night. ) and Earache (Right ear)    Subjective            Christin Morris presents to [facility]  Sore Throat  Associated symptoms: diarrhea and ear pain    Associated symptoms: no abdominal pain, no congestion, no cough and no vomiting    Earache  Associated symptoms: sore throat    Associated symptoms: no cough, no rhinorrhea and no congestion        History of Present Illness  The patient is a 29-year-old female who presents today for an acute illness with onset of symptoms last night.    She reports a sudden onset of symptoms last night, including throat and ear discomfort, body aches, and perspiration. She has been self-medicating with Tylenol since last night to alleviate the body aches and throat discomfort and took ibuprofen this morning. She does not report any nasal congestion or sinus pressure, except for her ear. She also does not report any cough or wheezing, but mentions a slight tickling sensation in her throat. She does not report any chest pain, nausea, vomiting, abdominal pain, headache, or painful urination. However, she does report mild diarrhea and an altered sense of taste.    She recently recovered from influenza type B infection, which was managed with Tamiflu. The previous infection was confirmed on 05/28/2025 slightly better than 3 weeks ago and all symptoms completely resolved and then this was an acute onset of symptoms just last night.      PHQ-2 Total Score:      PHQ-9 Total Score:        Past Medical History:   Diagnosis Date    ADHD (attention deficit hyperactivity disorder) April 2024    Allergic     PCN, Sulfa    Allergic rhinitis     Anxiety     Bipolar disorder April 2024    Bulimia nervosa started in high school    2012    Chronic migraine     Cluster headache     Depression     Deviated nasal septum     Disease of thyroid gland Graves  Disease        Headache, tension-type     Hyperthyroidism     Graves Diseases     Hypertrophy of both inferior nasal turbinates     IUD (intrauterine device) in place     Myofascial muscle pain     PTSD (post-traumatic stress disorder)     Complex ptsd    Recurrent acute sinusitis     Suicide attempt 10/15/2015    Tendonitis of ankle     TMJ dysfunction        Allergies   Allergen Reactions    Macrobid [Nitrofurantoin] Other (See Comments)    Penicillins Other (See Comments)    Sulfa Antibiotics Other (See Comments)        Past Surgical History:   Procedure Laterality Date    ABDOMINAL SURGERY  2021         SECTION      GANGLION CYST EXCISION          Social History     Tobacco Use    Smoking status: Never     Passive exposure: Past    Smokeless tobacco: Never   Vaping Use    Vaping status: Never Used   Substance Use Topics    Alcohol use: Never    Drug use: Never       Family History   Problem Relation Age of Onset    Heart disease Mother     Diabetes Mother     ADD / ADHD Mother     Anxiety disorder Mother     Bipolar disorder Mother     Depression Mother     Drug abuse Mother     Seizures Mother     Suicide Attempts Mother     Hypertension Mother     Migraines Mother     Mental illness Mother     Heart disease Father     Diabetes Father     Alcohol abuse Father     Anxiety disorder Father     Depression Father     Hypertension Father     Asthma Father     Hyperlipidemia Father     Arthritis Father     COPD Father     No Known Problems Sister     No Known Problems Brother     No Known Problems Maternal Aunt     No Known Problems Paternal Aunt     No Known Problems Maternal Uncle     No Known Problems Paternal Uncle     Alcohol abuse Maternal Grandfather     Heart disease Maternal Grandmother     Thyroid disease Maternal Grandmother     Dementia Maternal Grandmother     No Known Problems Paternal Grandfather     Stroke Paternal Grandmother     Heart disease Paternal Grandmother      Diabetes Paternal Grandmother     Anxiety disorder Paternal Grandmother     Dementia Paternal Grandmother     No Known Problems Cousin     OCD Neg Hx     Paranoid behavior Neg Hx     Schizophrenia Neg Hx     Self-Injurious Behavior  Neg Hx         There are no preventive care reminders to display for this patient.     Current Outpatient Medications on File Prior to Visit   Medication Sig    amphetamine-dextroamphetamine (Adderall) 10 MG tablet Take 1 tablet by mouth Daily for 30 days.    [START ON 6/28/2025] amphetamine-dextroamphetamine XR (Adderall XR) 20 MG 24 hr capsule Take 1 capsule by mouth Every Morning for 30 days    cetirizine (zyrTEC) 10 MG tablet Zyrtec 10 mg oral tablet take 1 tablet (10 mg) by oral route once daily   Active    FLUoxetine (PROzac) 10 MG capsule Take 1 capsule by mouth Daily for 180 days. Take with current 40 mg po dose to equal 50 mg po q day.    FLUoxetine (PROzac) 40 MG capsule     fluticasone (FLONASE) 50 MCG/ACT nasal spray 2 sprays into the nostril(s) as directed by provider Daily.    folic acid (FOLVITE) 1 MG tablet Take 1 tablet by mouth Daily.    propranolol (INDERAL) 20 MG tablet TAKE 0.5 - 1 TABLET BY MOUTH TWICE DAILY    SEMAGLUTIDE-WEIGHT MANAGEMENT SC Inject  under the skin into the appropriate area as directed. 0.5MG PER INJECTION ONCE PER WEEK     No current facility-administered medications on file prior to visit.       Immunization History   Administered Date(s) Administered    COVID-19 (MODERNA) 1st,2nd,3rd Dose Monovalent 05/17/2021, 06/14/2021    COVID-19 (UNSPECIFIED) 03/12/2020, 01/01/2025    DTaP, Unspecified 05/28/1998    Flu Vaccine Quad PF >36MO 09/30/2023    Flublok 18+yrs 09/30/2023    Fluzone  >6mos 09/23/2024    Fluzone (or Fluarix & Flulaval for VFC) >6mos 09/29/2016, 11/16/2020    MMR 05/28/1998    OPV 05/28/1998    Tdap 05/12/2015, 11/16/2020       Review of Systems   Constitutional:  Positive for diaphoresis.   HENT:  Positive for ear pain and sore  "throat. Negative for congestion, postnasal drip, rhinorrhea and sinus pressure.    Respiratory:  Negative for cough and wheezing.    Cardiovascular:  Negative for chest pain.   Gastrointestinal:  Positive for diarrhea. Negative for abdominal pain, nausea and vomiting.   Genitourinary:  Negative for dysuria.   Musculoskeletal:  Positive for arthralgias.   Neurological:  Negative for headache.        Objective     /72   Pulse 93   Temp 98.3 °F (36.8 °C) (Temporal)   Ht 167.6 cm (66\")   Wt 73.9 kg (163 lb)   SpO2 97%   BMI 26.31 kg/m²       Physical Exam  Vitals and nursing note reviewed.   Constitutional:       Appearance: Normal appearance.   HENT:      Head: Normocephalic.      Right Ear: Ear canal and external ear normal. Tympanic membrane is injected and erythematous.      Left Ear: Ear canal and external ear normal. Tympanic membrane is injected.      Nose: Nose normal.      Right Sinus: No maxillary sinus tenderness or frontal sinus tenderness.      Left Sinus: No maxillary sinus tenderness or frontal sinus tenderness.      Mouth/Throat:      Mouth: Mucous membranes are moist.      Pharynx: Posterior oropharyngeal erythema present.   Eyes:      Pupils: Pupils are equal, round, and reactive to light.   Cardiovascular:      Rate and Rhythm: Normal rate and regular rhythm.      Heart sounds: Normal heart sounds.   Pulmonary:      Effort: Pulmonary effort is normal.      Breath sounds: Normal breath sounds.   Abdominal:      Palpations: Abdomen is soft.   Musculoskeletal:         General: Normal range of motion.      Cervical back: Normal range of motion and neck supple.   Skin:     General: Skin is warm and dry.   Neurological:      Mental Status: She is alert and oriented to person, place, and time.   Psychiatric:         Mood and Affect: Mood normal.         Behavior: Behavior normal.         Thought Content: Thought content normal.         Judgment: Judgment normal.         Result Review :     The " following data was reviewed by: ANTONIA Vazquez on 06/19/2025:             POCT SARS-CoV-2 Antigen KELSEY + Flu (06/19/2025 13:02)  POCT rapid strep A (06/19/2025 13:02)  Results  Labs   - Strep test: Negative   - Influenza A test: Negative   - COVID-19 test: Negative  - Influenza B test: Positive               Assessment and Plan      Diagnoses and all orders for this visit:    1. Influenza B (Primary)  -     oseltamivir (Tamiflu) 75 MG capsule; Take 1 capsule by mouth 2 (Two) Times a Day.  Dispense: 10 capsule; Refill: 0    2. Sore throat  -     POCT rapid strep A  -     POCT SARS-CoV-2 Antigen KELSEY + Flu  -     oseltamivir (Tamiflu) 75 MG capsule; Take 1 capsule by mouth 2 (Two) Times a Day.  Dispense: 10 capsule; Refill: 0    3. Body aches  -     POCT rapid strep A  -     POCT SARS-CoV-2 Antigen KELSEY + Flu  -     oseltamivir (Tamiflu) 75 MG capsule; Take 1 capsule by mouth 2 (Two) Times a Day.  Dispense: 10 capsule; Refill: 0    Since has been over 3 weeks ago and her symptoms had completely resolved until restarting last night we will do empiric treatment with the Tamiflu as it appears she has flu again and patient off from work the rest of this week and may return to work on Tuesday and patient remain well-hydrated should anything worsen or persist follow-up and be reevaluated      Assessment & Plan  1. Acute illness.  - Sudden onset of symptoms last night, including throat and ear discomfort, body aches, and perspiration.  - Increased pain and limited mobility.  - Strep test is negative. Influenza A and COVID-19 tests are negative.  - Prescription for Tamiflu has been issued. Advised to maintain adequate hydration. Work excuse provided for the remainder of the week.          Follow Up     Return if symptoms worsen or fail to improve.    Patient was given instructions and counseling regarding her condition or for health maintenance advice. Please see specific information pulled into the AVS if  appropriate.            Christin Morris  reports that she has never smoked. She has been exposed to tobacco smoke. She has never used smokeless tobacco. I have educated her on the risk of diseases from using tobacco products such as cancer, COPD, and heart disease.           Patient or patient representative verbalized consent for the use of Ambient Listening during the visit with  ANTONIA Vazquez for chart documentation. 6/19/2025  13:46 EDT

## 2025-06-25 ENCOUNTER — OFFICE VISIT (OUTPATIENT)
Dept: FAMILY MEDICINE CLINIC | Facility: CLINIC | Age: 30
End: 2025-06-25
Payer: COMMERCIAL

## 2025-06-25 VITALS
HEART RATE: 123 BPM | SYSTOLIC BLOOD PRESSURE: 128 MMHG | TEMPERATURE: 98 F | HEIGHT: 66 IN | DIASTOLIC BLOOD PRESSURE: 82 MMHG | WEIGHT: 166.6 LBS | BODY MASS INDEX: 26.78 KG/M2 | OXYGEN SATURATION: 98 %

## 2025-06-25 DIAGNOSIS — R52 BODY ACHES: ICD-10-CM

## 2025-06-25 DIAGNOSIS — R06.2 WHEEZING: Primary | ICD-10-CM

## 2025-06-25 DIAGNOSIS — J10.1 INFLUENZA B: ICD-10-CM

## 2025-06-25 DIAGNOSIS — R05.1 ACUTE COUGH: ICD-10-CM

## 2025-06-25 LAB
ALBUMIN SERPL-MCNC: 4.2 G/DL (ref 3.5–5.2)
ALBUMIN/GLOB SERPL: 1.4 G/DL
ALP SERPL-CCNC: 63 U/L (ref 39–117)
ALT SERPL W P-5'-P-CCNC: 13 U/L (ref 1–33)
ANION GAP SERPL CALCULATED.3IONS-SCNC: 10.4 MMOL/L (ref 5–15)
AST SERPL-CCNC: 13 U/L (ref 1–32)
B PARAPERT DNA SPEC QL NAA+PROBE: NOT DETECTED
B PERT DNA SPEC QL NAA+PROBE: NOT DETECTED
BASOPHILS # BLD AUTO: 0.02 10*3/MM3 (ref 0–0.2)
BASOPHILS NFR BLD AUTO: 0.1 % (ref 0–1.5)
BILIRUB SERPL-MCNC: 0.5 MG/DL (ref 0–1.2)
BUN SERPL-MCNC: 12 MG/DL (ref 6–20)
BUN/CREAT SERPL: 16.9 (ref 7–25)
C PNEUM DNA NPH QL NAA+NON-PROBE: NOT DETECTED
CALCIUM SPEC-SCNC: 9.5 MG/DL (ref 8.6–10.5)
CHLORIDE SERPL-SCNC: 106 MMOL/L (ref 98–107)
CO2 SERPL-SCNC: 20.6 MMOL/L (ref 22–29)
CREAT SERPL-MCNC: 0.71 MG/DL (ref 0.57–1)
DEPRECATED RDW RBC AUTO: 39.7 FL (ref 37–54)
EGFRCR SERPLBLD CKD-EPI 2021: 116.7 ML/MIN/1.73
EOSINOPHIL # BLD AUTO: 0 10*3/MM3 (ref 0–0.4)
EOSINOPHIL NFR BLD AUTO: 0 % (ref 0.3–6.2)
ERYTHROCYTE [DISTWIDTH] IN BLOOD BY AUTOMATED COUNT: 11.7 % (ref 12.3–15.4)
FLUAV SUBTYP SPEC NAA+PROBE: NOT DETECTED
FLUBV RNA ISLT QL NAA+PROBE: NOT DETECTED
GLOBULIN UR ELPH-MCNC: 3 GM/DL
GLUCOSE SERPL-MCNC: 114 MG/DL (ref 65–99)
HADV DNA SPEC NAA+PROBE: NOT DETECTED
HCOV 229E RNA SPEC QL NAA+PROBE: NOT DETECTED
HCOV HKU1 RNA SPEC QL NAA+PROBE: NOT DETECTED
HCOV NL63 RNA SPEC QL NAA+PROBE: NOT DETECTED
HCOV OC43 RNA SPEC QL NAA+PROBE: NOT DETECTED
HCT VFR BLD AUTO: 44.2 % (ref 34–46.6)
HGB BLD-MCNC: 14.4 G/DL (ref 12–15.9)
HMPV RNA NPH QL NAA+NON-PROBE: NOT DETECTED
HPIV1 RNA ISLT QL NAA+PROBE: NOT DETECTED
HPIV2 RNA SPEC QL NAA+PROBE: NOT DETECTED
HPIV3 RNA NPH QL NAA+PROBE: NOT DETECTED
HPIV4 P GENE NPH QL NAA+PROBE: NOT DETECTED
IMM GRANULOCYTES # BLD AUTO: 0.08 10*3/MM3 (ref 0–0.05)
IMM GRANULOCYTES NFR BLD AUTO: 0.4 % (ref 0–0.5)
LYMPHOCYTES # BLD AUTO: 1.06 10*3/MM3 (ref 0.7–3.1)
LYMPHOCYTES NFR BLD AUTO: 5.2 % (ref 19.6–45.3)
M PNEUMO IGG SER IA-ACNC: NOT DETECTED
MCH RBC QN AUTO: 30.8 PG (ref 26.6–33)
MCHC RBC AUTO-ENTMCNC: 32.6 G/DL (ref 31.5–35.7)
MCV RBC AUTO: 94.6 FL (ref 79–97)
MONOCYTES # BLD AUTO: 0.76 10*3/MM3 (ref 0.1–0.9)
MONOCYTES NFR BLD AUTO: 3.7 % (ref 5–12)
NEUTROPHILS NFR BLD AUTO: 18.47 10*3/MM3 (ref 1.7–7)
NEUTROPHILS NFR BLD AUTO: 90.6 % (ref 42.7–76)
NRBC BLD AUTO-RTO: 0 /100 WBC (ref 0–0.2)
PLATELET # BLD AUTO: 318 10*3/MM3 (ref 140–450)
PMV BLD AUTO: 10.5 FL (ref 6–12)
POTASSIUM SERPL-SCNC: 3.9 MMOL/L (ref 3.5–5.2)
PROT SERPL-MCNC: 7.2 G/DL (ref 6–8.5)
RBC # BLD AUTO: 4.67 10*6/MM3 (ref 3.77–5.28)
RHINOVIRUS RNA SPEC NAA+PROBE: NOT DETECTED
RSV RNA NPH QL NAA+NON-PROBE: NOT DETECTED
SARS-COV-2 RNA RESP QL NAA+PROBE: NOT DETECTED
SODIUM SERPL-SCNC: 137 MMOL/L (ref 136–145)
WBC NRBC COR # BLD AUTO: 20.39 10*3/MM3 (ref 3.4–10.8)

## 2025-06-25 PROCEDURE — 85025 COMPLETE CBC W/AUTO DIFF WBC: CPT

## 2025-06-25 PROCEDURE — 0202U NFCT DS 22 TRGT SARS-COV-2: CPT

## 2025-06-25 PROCEDURE — 80053 COMPREHEN METABOLIC PANEL: CPT

## 2025-06-25 RX ORDER — VALACYCLOVIR HYDROCHLORIDE 1 G/1
2 TABLET, FILM COATED ORAL EVERY 12 HOURS SCHEDULED
COMMUNITY
Start: 2025-06-20

## 2025-06-25 RX ORDER — BROMPHENIRAMINE MALEATE, PSEUDOEPHEDRINE HYDROCHLORIDE, AND DEXTROMETHORPHAN HYDROBROMIDE 2; 30; 10 MG/5ML; MG/5ML; MG/5ML
5 SYRUP ORAL 4 TIMES DAILY PRN
Qty: 473 ML | Refills: 0 | Status: SHIPPED | OUTPATIENT
Start: 2025-06-25

## 2025-06-25 RX ORDER — BENZONATATE 100 MG/1
200 CAPSULE ORAL 3 TIMES DAILY PRN
Qty: 42 CAPSULE | Refills: 0 | Status: SHIPPED | OUTPATIENT
Start: 2025-06-25 | End: 2025-07-02

## 2025-06-25 RX ORDER — METHYLPREDNISOLONE 4 MG/1
4 TABLET ORAL DAILY
COMMUNITY
Start: 2025-06-24

## 2025-06-25 NOTE — PROGRESS NOTES
Chief Complaint  Chills and Generalized Body Aches (Pt had flu b last week and is still having some effects from it. She is on a steroid that she started yesterday but just wants to be checked out. )    The PHQ has not been completed during this encounter.         History of Present Illness:  Christin Morris is a 29 y.o. female who presents to [facility] with a past medical history of  Past Medical History:   Diagnosis Date    ADHD (attention deficit hyperactivity disorder) April 2024    Allergic     PCN, Sulfa    Allergic rhinitis     Anxiety     Bipolar disorder April 2024    Bulimia nervosa started in high school    2012    Chronic migraine     Cluster headache     Depression     Deviated nasal septum     Disease of thyroid gland Graves Disease    2021    Headache, tension-type     Hyperthyroidism     Graves Diseases 2022    Hypertrophy of both inferior nasal turbinates     IUD (intrauterine device) in place     Myofascial muscle pain     PTSD (post-traumatic stress disorder)     Complex ptsd    Recurrent acute sinusitis     Suicide attempt 10/15/2015    Tendonitis of ankle     TMJ dysfunction         History of Present Illness  The patient is a 29-year-old female who presents today for a recent influenza infection.    She has been diagnosed with influenza B on multiple occasions. Initially, she experienced an improvement in her condition; however, she reported a relapse of symptoms on Sunday. She has been experiencing body aches and a single episode of low-grade fever, recorded at 99.2 degrees, the night before last. She was prescribed Medrol Dosepak by Uzma, an NP at her workplace. Her lungs were reported clear upon examination yesterday, but due to her persistent cough, a chest x-ray was ordered. The cough intensifies when she is in a supine position, accompanied by a sensation of chest tightness. She has no history of asthma. She has received the influenza vaccine. She is  "currently taking vitamin C supplements. She has a past medical history of Graves' disease.    PAST SURGICAL HISTORY:  Ear tubes insertion during childhood.      Objective   Vital Signs:   Vitals:    06/25/25 1017   BP: 128/82   BP Location: Left arm   Patient Position: Sitting   Cuff Size: Adult   Pulse: (!) 123   Temp: 98 °F (36.7 °C)   TempSrc: Temporal   SpO2: 98%   Weight: 75.6 kg (166 lb 9.6 oz)   Height: 167.6 cm (66\")   PainSc: 2    PainLoc: Generalized     Body mass index is 26.89 kg/m².    Wt Readings from Last 3 Encounters:   06/25/25 75.6 kg (166 lb 9.6 oz)   06/19/25 73.9 kg (163 lb)   06/09/25 74.4 kg (164 lb)     BP Readings from Last 3 Encounters:   06/25/25 128/82   06/19/25 118/72   06/09/25 112/68       Health Maintenance   Topic Date Due    ANNUAL PHYSICAL  06/25/2025    INFLUENZA VACCINE  07/01/2025    PAP SMEAR  06/09/2028    TDAP/TD VACCINES (3 - Td or Tdap) 11/16/2030    HEPATITIS C SCREENING  Completed    COVID-19 Vaccine  Completed    Pneumococcal Vaccine 0-49  Aged Out       Review of Systems   Physical Exam  Vitals reviewed.   Constitutional:       Appearance: Normal appearance.   Eyes:      Pupils: Pupils are equal, round, and reactive to light.   Cardiovascular:      Rate and Rhythm: Normal rate and regular rhythm.   Pulmonary:      Effort: Pulmonary effort is normal.      Breath sounds: Wheezing present.   Skin:     General: Skin is warm and dry.   Neurological:      General: No focal deficit present.      Mental Status: She is alert and oriented to person, place, and time.   Psychiatric:         Mood and Affect: Mood normal.            Result Review :  The following data was reviewed by: ANTONIA Prakash on 06/25/2025:  Office Visit on 06/25/2025   Component Date Value    ADENOVIRUS, PCR 06/25/2025 Not Detected     Coronavirus 229E 06/25/2025 Not Detected     Coronavirus HKU1 06/25/2025 Not Detected     Coronavirus NL63 06/25/2025 Not Detected     Coronavirus OC43 06/25/2025 Not " Detected     COVID19 06/25/2025 Not Detected     Human Metapneumovirus 06/25/2025 Not Detected     Human Rhinovirus/Enterov* 06/25/2025 Not Detected     Influenza A PCR 06/25/2025 Not Detected     Influenza B PCR 06/25/2025 Not Detected     Parainfluenza Virus 1 06/25/2025 Not Detected     Parainfluenza Virus 2 06/25/2025 Not Detected     Parainfluenza Virus 3 06/25/2025 Not Detected     Parainfluenza Virus 4 06/25/2025 Not Detected     RSV, PCR 06/25/2025 Not Detected     Bordetella pertussis pcr 06/25/2025 Not Detected     Bordetella parapertussis* 06/25/2025 Not Detected     Chlamydophila pneumoniae* 06/25/2025 Not Detected     Mycoplasma pneumo by PCR 06/25/2025 Not Detected     WBC 06/25/2025 20.39 (H)     RBC 06/25/2025 4.67     Hemoglobin 06/25/2025 14.4     Hematocrit 06/25/2025 44.2     MCV 06/25/2025 94.6     MCH 06/25/2025 30.8     MCHC 06/25/2025 32.6     RDW 06/25/2025 11.7 (L)     RDW-SD 06/25/2025 39.7     MPV 06/25/2025 10.5     Platelets 06/25/2025 318     Neutrophil % 06/25/2025 90.6 (H)     Lymphocyte % 06/25/2025 5.2 (L)     Monocyte % 06/25/2025 3.7 (L)     Eosinophil % 06/25/2025 0.0 (L)     Basophil % 06/25/2025 0.1     Immature Grans % 06/25/2025 0.4     Neutrophils, Absolute 06/25/2025 18.47 (H)     Lymphocytes, Absolute 06/25/2025 1.06     Monocytes, Absolute 06/25/2025 0.76     Eosinophils, Absolute 06/25/2025 0.00     Basophils, Absolute 06/25/2025 0.02     Immature Grans, Absolute 06/25/2025 0.08 (H)     nRBC 06/25/2025 0.0     Glucose 06/25/2025 114 (H)     BUN 06/25/2025 12.0     Creatinine 06/25/2025 0.71     Sodium 06/25/2025 137     Potassium 06/25/2025 3.9     Chloride 06/25/2025 106     CO2 06/25/2025 20.6 (L)     Calcium 06/25/2025 9.5     Total Protein 06/25/2025 7.2     Albumin 06/25/2025 4.2     ALT (SGPT) 06/25/2025 13     AST (SGOT) 06/25/2025 13     Alkaline Phosphatase 06/25/2025 63     Total Bilirubin 06/25/2025 0.5     Globulin 06/25/2025 3.0     A/G Ratio 06/25/2025 1.4      BUN/Creatinine Ratio 06/25/2025 16.9     Anion Gap 06/25/2025 10.4     eGFR 06/25/2025 116.7    Office Visit on 06/19/2025   Component Date Value    Rapid Strep A Screen 06/19/2025 Negative     Internal Control 06/19/2025 Passed     Lot Number 06/19/2025 [id number]     Expiration Date 06/19/2025 04/30/2026     SARS Antigen 06/19/2025 Not Detected     Influenza A Antigen KELSEY 06/19/2025 Not Detected     Influenza B Antigen KELSEY 06/19/2025 Detected (A)     Internal Control 06/19/2025 Passed     Lot Number 06/19/2025 [id number]     Expiration Date 06/19/2025 03/03/2026    Office Visit on 06/09/2025   Component Date Value    Color 06/09/2025 Dark Yellow     Clarity, UA 06/09/2025 Clear     Specific Gravity  06/09/2025 1.015     pH, Urine 06/09/2025 6.5     Leukocytes 06/09/2025 Negative     Nitrite, UA 06/09/2025 Negative     Protein, POC 06/09/2025 30 mg/dL (A)     Glucose, UA 06/09/2025 Negative     Ketones, UA 06/09/2025 Negative     Urobilinogen, UA 06/09/2025 0.2 E.U./dL     Bilirubin 06/09/2025 Negative     Blood, UA 06/09/2025 Large (A)     Lot Number 06/09/2025 [id number]     Expiration Date 06/09/2025 01/24/2027     Urine Culture 06/09/2025 No growth     Folate 06/09/2025 5.37     Vitamin B-12 06/09/2025 643     25 Hydroxy, Vitamin D 06/09/2025 29.5 (L)     TSH 06/09/2025 0.240 (L)     Free T4 06/09/2025 1.84 (H)     WBC 06/09/2025 7.94     RBC 06/09/2025 4.95     Hemoglobin 06/09/2025 15.8     Hematocrit 06/09/2025 46.4     MCV 06/09/2025 93.7     MCH 06/09/2025 31.9     MCHC 06/09/2025 34.1     RDW 06/09/2025 11.8 (L)     RDW-SD 06/09/2025 40.8     MPV 06/09/2025 11.1     Platelets 06/09/2025 289     Glucose 06/09/2025 72     BUN 06/09/2025 10.0     Creatinine 06/09/2025 0.87     Sodium 06/09/2025 136     Potassium 06/09/2025 3.4 (L)     Chloride 06/09/2025 101     CO2 06/09/2025 24.0     Calcium 06/09/2025 9.0     Total Protein 06/09/2025 7.4     Albumin 06/09/2025 4.5     ALT (SGPT) 06/09/2025 14     AST (SGOT)  06/09/2025 17     Alkaline Phosphatase 06/09/2025 66     Total Bilirubin 06/09/2025 0.7     Globulin 06/09/2025 2.9     A/G Ratio 06/09/2025 1.6     BUN/Creatinine Ratio 06/09/2025 11.5     Anion Gap 06/09/2025 11.0     eGFR 06/09/2025 91.5     Ferritin 06/09/2025 65.10     Iron 06/09/2025 91     Iron Saturation (TSAT) 06/09/2025 24     Transferrin 06/09/2025 259     TIBC 06/09/2025 386     Diagnosis 06/09/2025 Comment     Specimen adequacy: 06/09/2025 Comment     Clinician Provided ICD-1* 06/09/2025 Comment     Performed by: 06/09/2025 Comment     . 06/09/2025 .     Note: 06/09/2025 Comment     Method: 06/09/2025 Comment     HPV Aptima 06/09/2025 Negative     Magnesium 06/09/2025 2.1     Bacterial vaginosis 06/09/2025 Negative     Candida glabrata/krusei 06/09/2025 Not Detected     [employer]NA [employer] 06/09/2025 Not Detected     Trichomonas vaginalis 06/09/2025 Not Detected    Office Visit on 05/28/2025   Component Date Value    SARS Antigen 05/28/2025 Not Detected     Influenza A Antigen KELSEY 05/28/2025 Not Detected     Influenza B Antigen KELSEY 05/28/2025 Detected (A)     Internal Control 05/28/2025 Passed     Lot Number 05/28/2025 [id number]     Expiration Date 05/28/2025 1-    XR Chest 2 View  Result Date: 6/25/2025  Impression: No acute cardiopulmonary findings. Electronically Signed: Eloy White MD  6/25/2025 11:05 AM EDT  Workstation ID: [id number]      Results  Imaging   - Chest x-ray: 06/25/2025, No abnormalities      Procedures        Assessment and Plan   Diagnoses and all orders for this visit:    1. Wheezing (Primary)  -     XR Chest 2 View    2. Influenza B  -     Respiratory Panel PCR w/COVID-19(SARS-CoV-2) HOLLY/RAMAN/SETH/PAD/COR/ELIAS In-House, NP Swab in UTM/VTM, 2 HR TAT - Swab, Nasopharynx    3. Acute cough  -     Respiratory Panel PCR w/COVID-19(SARS-CoV-2) HOLLY/RAMAN/SEHT/PAD/COR/ELIAS In-House, NP Swab in UTM/VTM, 2 HR TAT - Swab, Nasopharynx  -     brompheniramine-pseudoephedrine-DM 30-2-10 MG/5ML syrup;  Take 5 mL by mouth 4 (Four) Times a Day As Needed for Cough.  Dispense: 473 mL; Refill: 0  -     benzonatate (Tessalon Perles) 100 MG capsule; Take 2 capsules by mouth 3 (Three) Times a Day As Needed for Cough for up to 7 days.  Dispense: 42 capsule; Refill: 0    4. Body aches  -     CBC Auto Differential  -     Comprehensive Metabolic Panel        Assessment & Plan  Viral vs BActerial  - Symptoms have been persistent, necessitating further investigation to rule out other potential causes.  - Chest x-ray performed, results normal; respiratory swab and blood work ordered to monitor white blood cell count.  - Discussion of autoimmune disease (Graves) and its impact on susceptibility; review of medication regimen including Medrol Dosepak.  - Prescriptions for Bromfed and Tessalon Perles provided; instructions to take two Tessalon Perles at a time if coughing persists at work. Adequate hydration and vitamin C supplementation advised. Antibiotic course to be initiated if respiratory swab is negative and white blood cell count is elevated; viral infection management if swab is positive and white blood cell count is normal.                FOLLOW UP  Return if symptoms worsen or fail to improve.    Patient was given instructions and counseling regarding her condition or for health maintenance advice. Please see specific information pulled into the AVS if appropriate.       ANTONIA Prakash  06/26/25  08:57 EDT    CURRENT & DISCONTINUED MEDICATIONS  Current Outpatient Medications   Medication Instructions    amphetamine-dextroamphetamine (Adderall) 10 MG tablet 10 mg, Oral, Daily    [START ON 6/28/2025] amphetamine-dextroamphetamine XR (Adderall XR) 20 MG 24 hr capsule 20 mg, Oral, Every Morning    benzonatate (TESSALON PERLES) 200 mg, Oral, 3 Times Daily PRN    brompheniramine-pseudoephedrine-DM 30-2-10 MG/5ML syrup 5 mL, Oral, 4 Times Daily PRN    cetirizine (zyrTEC) 10 MG tablet Zyrtec 10 mg oral tablet take 1 tablet  (10 mg) by oral route once daily   Active    doxycycline (VIBRAMYCIN) 100 mg, Oral, 2 Times Daily    fluconazole (DIFLUCAN) 150 mg, Oral, Once    FLUoxetine (PROzac) 40 MG capsule No dose, route, or frequency recorded.    FLUoxetine (PROZAC) 10 mg, Oral, Daily, Take with current 40 mg po dose to equal 50 mg po q day.    fluticasone (FLONASE) 50 MCG/ACT nasal spray 2 sprays, Nasal, Daily    folic acid (FOLVITE) 1,000 mcg, Oral, Daily    methylPREDNISolone (MEDROL) 4 mg, Daily    nitrofurantoin (macrocrystal-monohydrate) (MACROBID) 100 mg, Oral, 2 Times Daily    oseltamivir (TAMIFLU) 75 mg, Oral, 2 Times Daily    propranolol (INDERAL) 20 MG tablet TAKE 0.5 - 1 TABLET BY MOUTH TWICE DAILY    SEMAGLUTIDE-WEIGHT MANAGEMENT SC Inject  under the skin into the appropriate area as directed. 0.5MG PER INJECTION ONCE PER WEEK    valACYclovir (VALTREX) 1000 MG tablet 2 tablets, Every 12 Hours Scheduled       There are no discontinued medications.     EMR Dragon/Transcription disclaimer:  Parts of this encounter note are electronic transcription/translation of spoken language to printed text.     Patient or patient representative verbalized consent for the use of Ambient Listening during the visit with  ANTONIA Prakash for chart documentation. 6/26/2025  10:28 EDT

## 2025-06-25 NOTE — PROGRESS NOTES
..  Venipuncture Blood Specimen Collection  Venipuncture performed in Lt arm by Olga Gruber MA with good hemostasis. Patient tolerated the procedure well without complications.   06/25/25   Olga Gruber MA

## 2025-06-27 ENCOUNTER — HOSPITAL ENCOUNTER (EMERGENCY)
Facility: HOSPITAL | Age: 30
Discharge: HOME | End: 2025-06-28
Attending: EMERGENCY MEDICINE
Payer: COMMERCIAL

## 2025-06-27 ENCOUNTER — CLINICAL SUPPORT (OUTPATIENT)
Dept: FAMILY MEDICINE CLINIC | Facility: CLINIC | Age: 30
End: 2025-06-27
Payer: COMMERCIAL

## 2025-06-27 ENCOUNTER — APPOINTMENT (OUTPATIENT)
Dept: CT IMAGING | Facility: HOSPITAL | Age: 30
End: 2025-06-27
Payer: COMMERCIAL

## 2025-06-27 DIAGNOSIS — D72.829 LEUKOCYTOSIS, UNSPECIFIED TYPE: ICD-10-CM

## 2025-06-27 DIAGNOSIS — R39.9 URINARY SYMPTOM OR SIGN: ICD-10-CM

## 2025-06-27 DIAGNOSIS — J02.9 SORE THROAT: ICD-10-CM

## 2025-06-27 DIAGNOSIS — D72.829 LEUKOCYTOSIS, UNSPECIFIED TYPE: Primary | ICD-10-CM

## 2025-06-27 DIAGNOSIS — R31.21 ASYMPTOMATIC MICROSCOPIC HEMATURIA: ICD-10-CM

## 2025-06-27 LAB
ALBUMIN SERPL-MCNC: 4.6 G/DL (ref 3.5–5.2)
ALBUMIN SERPL-MCNC: 4.6 G/DL (ref 3.5–5.2)
ALBUMIN/GLOB SERPL: 1.8 G/DL
ALBUMIN/GLOB SERPL: 1.8 G/DL
ALP SERPL-CCNC: 62 U/L (ref 39–117)
ALP SERPL-CCNC: 68 U/L (ref 39–117)
ALT SERPL W P-5'-P-CCNC: 11 U/L (ref 1–33)
ALT SERPL W P-5'-P-CCNC: 22 U/L (ref 1–33)
ANION GAP SERPL CALCULATED.3IONS-SCNC: 11.9 MMOL/L (ref 5–15)
ANION GAP SERPL CALCULATED.3IONS-SCNC: 13.3 MMOL/L (ref 5–15)
AST SERPL-CCNC: 13 U/L (ref 1–32)
AST SERPL-CCNC: 21 U/L (ref 1–32)
BACTERIA UR QL AUTO: ABNORMAL /HPF
BASOPHILS # BLD AUTO: 0.04 10*3/MM3 (ref 0–0.2)
BASOPHILS # BLD AUTO: 0.05 10*3/MM3 (ref 0–0.2)
BASOPHILS NFR BLD AUTO: 0.2 % (ref 0–1.5)
BASOPHILS NFR BLD AUTO: 0.2 % (ref 0–1.5)
BILIRUB SERPL-MCNC: 0.4 MG/DL (ref 0–1.2)
BILIRUB SERPL-MCNC: 0.6 MG/DL (ref 0–1.2)
BILIRUB UR QL STRIP: NEGATIVE
BUN SERPL-MCNC: 15 MG/DL (ref 6–20)
BUN SERPL-MCNC: 16 MG/DL (ref 6–20)
BUN/CREAT SERPL: 19.3 (ref 7–25)
BUN/CREAT SERPL: 19.5 (ref 7–25)
CALCIUM SPEC-SCNC: 9.2 MG/DL (ref 8.6–10.5)
CALCIUM SPEC-SCNC: 9.4 MG/DL (ref 8.6–10.5)
CHLORIDE SERPL-SCNC: 104 MMOL/L (ref 98–107)
CHLORIDE SERPL-SCNC: 106 MMOL/L (ref 98–107)
CLARITY UR: ABNORMAL
CO2 SERPL-SCNC: 22.7 MMOL/L (ref 22–29)
CO2 SERPL-SCNC: 24.1 MMOL/L (ref 22–29)
COLOR UR: ABNORMAL
CREAT SERPL-MCNC: 0.77 MG/DL (ref 0.57–1)
CREAT SERPL-MCNC: 0.83 MG/DL (ref 0.57–1)
D-LACTATE SERPL-SCNC: 2.5 MMOL/L (ref 0.5–2)
DEPRECATED RDW RBC AUTO: 38 FL (ref 37–54)
DEPRECATED RDW RBC AUTO: 38.1 FL (ref 37–54)
EGFRCR SERPLBLD CKD-EPI 2021: 105.9 ML/MIN/1.73
EGFRCR SERPLBLD CKD-EPI 2021: 96.8 ML/MIN/1.73
EOSINOPHIL # BLD AUTO: 0.03 10*3/MM3 (ref 0–0.4)
EOSINOPHIL # BLD AUTO: 0.03 10*3/MM3 (ref 0–0.4)
EOSINOPHIL NFR BLD AUTO: 0.1 % (ref 0.3–6.2)
EOSINOPHIL NFR BLD AUTO: 0.2 % (ref 0.3–6.2)
ERYTHROCYTE [DISTWIDTH] IN BLOOD BY AUTOMATED COUNT: 11.3 % (ref 12.3–15.4)
ERYTHROCYTE [DISTWIDTH] IN BLOOD BY AUTOMATED COUNT: 11.5 % (ref 12.3–15.4)
GLOBULIN UR ELPH-MCNC: 2.6 GM/DL
GLOBULIN UR ELPH-MCNC: 2.6 GM/DL
GLUCOSE SERPL-MCNC: 110 MG/DL (ref 65–99)
GLUCOSE SERPL-MCNC: 81 MG/DL (ref 65–99)
GLUCOSE UR STRIP-MCNC: NEGATIVE MG/DL
HCG INTACT+B SERPL-ACNC: <0.5 MIU/ML
HCT VFR BLD AUTO: 42.6 % (ref 34–46.6)
HCT VFR BLD AUTO: 45.2 % (ref 34–46.6)
HGB BLD-MCNC: 14.4 G/DL (ref 12–15.9)
HGB BLD-MCNC: 15.1 G/DL (ref 12–15.9)
HGB UR QL STRIP.AUTO: ABNORMAL
HOLD SPECIMEN: NORMAL
HYALINE CASTS UR QL AUTO: ABNORMAL /LPF
IMM GRANULOCYTES # BLD AUTO: 0.13 10*3/MM3 (ref 0–0.05)
IMM GRANULOCYTES # BLD AUTO: 0.13 10*3/MM3 (ref 0–0.05)
IMM GRANULOCYTES NFR BLD AUTO: 0.6 % (ref 0–0.5)
IMM GRANULOCYTES NFR BLD AUTO: 0.8 % (ref 0–0.5)
KETONES UR QL STRIP: ABNORMAL
LEUKOCYTE ESTERASE UR QL STRIP.AUTO: NEGATIVE
LYMPHOCYTES # BLD AUTO: 1.92 10*3/MM3 (ref 0.7–3.1)
LYMPHOCYTES # BLD AUTO: 2.86 10*3/MM3 (ref 0.7–3.1)
LYMPHOCYTES NFR BLD AUTO: 11.4 % (ref 19.6–45.3)
LYMPHOCYTES NFR BLD AUTO: 13.9 % (ref 19.6–45.3)
MCH RBC QN AUTO: 30.8 PG (ref 26.6–33)
MCH RBC QN AUTO: 30.9 PG (ref 26.6–33)
MCHC RBC AUTO-ENTMCNC: 33.4 G/DL (ref 31.5–35.7)
MCHC RBC AUTO-ENTMCNC: 33.8 G/DL (ref 31.5–35.7)
MCV RBC AUTO: 91 FL (ref 79–97)
MCV RBC AUTO: 92.6 FL (ref 79–97)
MONOCYTES # BLD AUTO: 1.37 10*3/MM3 (ref 0.1–0.9)
MONOCYTES # BLD AUTO: 1.41 10*3/MM3 (ref 0.1–0.9)
MONOCYTES NFR BLD AUTO: 6.6 % (ref 5–12)
MONOCYTES NFR BLD AUTO: 8.3 % (ref 5–12)
NEUTROPHILS NFR BLD AUTO: 13.36 10*3/MM3 (ref 1.7–7)
NEUTROPHILS NFR BLD AUTO: 16.2 10*3/MM3 (ref 1.7–7)
NEUTROPHILS NFR BLD AUTO: 78.6 % (ref 42.7–76)
NEUTROPHILS NFR BLD AUTO: 79.1 % (ref 42.7–76)
NITRITE UR QL STRIP: NEGATIVE
NRBC BLD AUTO-RTO: 0 /100 WBC (ref 0–0.2)
NRBC BLD AUTO-RTO: 0 /100 WBC (ref 0–0.2)
PH UR STRIP.AUTO: 6 [PH] (ref 5–8)
PLATELET # BLD AUTO: 312 10*3/MM3 (ref 140–450)
PLATELET # BLD AUTO: 347 10*3/MM3 (ref 140–450)
PMV BLD AUTO: 10.2 FL (ref 6–12)
PMV BLD AUTO: 10.9 FL (ref 6–12)
POTASSIUM SERPL-SCNC: 3.8 MMOL/L (ref 3.5–5.2)
POTASSIUM SERPL-SCNC: 4.6 MMOL/L (ref 3.5–5.2)
PROT SERPL-MCNC: 7.2 G/DL (ref 6–8.5)
PROT SERPL-MCNC: 7.2 G/DL (ref 6–8.5)
PROT UR QL STRIP: ABNORMAL
RBC # BLD AUTO: 4.68 10*6/MM3 (ref 3.77–5.28)
RBC # BLD AUTO: 4.88 10*6/MM3 (ref 3.77–5.28)
RBC # UR STRIP: ABNORMAL /HPF
REF LAB TEST METHOD: ABNORMAL
SODIUM SERPL-SCNC: 140 MMOL/L (ref 136–145)
SODIUM SERPL-SCNC: 142 MMOL/L (ref 136–145)
SP GR UR STRIP: >1.03 (ref 1–1.03)
SQUAMOUS #/AREA URNS HPF: ABNORMAL /HPF
UROBILINOGEN UR QL STRIP: ABNORMAL
WBC # UR STRIP: ABNORMAL /HPF
WBC NRBC COR # BLD AUTO: 16.89 10*3/MM3 (ref 3.4–10.8)
WBC NRBC COR # BLD AUTO: 20.64 10*3/MM3 (ref 3.4–10.8)
WHOLE BLOOD HOLD COAG: NORMAL
WHOLE BLOOD HOLD SPECIMEN: NORMAL

## 2025-06-27 PROCEDURE — 80053 COMPREHEN METABOLIC PANEL: CPT

## 2025-06-27 PROCEDURE — 86664 EPSTEIN-BARR NUCLEAR ANTIGEN: CPT

## 2025-06-27 PROCEDURE — 86665 EPSTEIN-BARR CAPSID VCA: CPT

## 2025-06-27 PROCEDURE — 99285 EMERGENCY DEPT VISIT HI MDM: CPT

## 2025-06-27 PROCEDURE — 84702 CHORIONIC GONADOTROPIN TEST: CPT

## 2025-06-27 PROCEDURE — 83605 ASSAY OF LACTIC ACID: CPT

## 2025-06-27 PROCEDURE — 85025 COMPLETE CBC W/AUTO DIFF WBC: CPT | Performed by: EMERGENCY MEDICINE

## 2025-06-27 PROCEDURE — 80053 COMPREHEN METABOLIC PANEL: CPT | Performed by: EMERGENCY MEDICINE

## 2025-06-27 PROCEDURE — 87040 BLOOD CULTURE FOR BACTERIA: CPT

## 2025-06-27 PROCEDURE — 36415 COLL VENOUS BLD VENIPUNCTURE: CPT

## 2025-06-27 PROCEDURE — 74177 CT ABD & PELVIS W/CONTRAST: CPT

## 2025-06-27 PROCEDURE — 81001 URINALYSIS AUTO W/SCOPE: CPT

## 2025-06-27 PROCEDURE — 85025 COMPLETE CBC W/AUTO DIFF WBC: CPT

## 2025-06-27 NOTE — PROGRESS NOTES
..  Venipuncture Blood Specimen Collection  Venipuncture performed in Lt arm by Olga Gruber MA with good hemostasis. Patient tolerated the procedure well without complications.   06/27/25   Olga Gruber MA

## 2025-06-28 VITALS
BODY MASS INDEX: 26.93 KG/M2 | TEMPERATURE: 98.6 F | HEIGHT: 66 IN | RESPIRATION RATE: 18 BRPM | OXYGEN SATURATION: 97 % | HEART RATE: 71 BPM | DIASTOLIC BLOOD PRESSURE: 69 MMHG | SYSTOLIC BLOOD PRESSURE: 105 MMHG | WEIGHT: 167.55 LBS

## 2025-06-28 LAB
BACTERIA UR QL AUTO: ABNORMAL /HPF
BILIRUB UR QL STRIP: NEGATIVE
CLARITY UR: CLEAR
COLOR UR: YELLOW
D-LACTATE SERPL-SCNC: 3.2 MMOL/L (ref 0.5–2)
GLUCOSE UR STRIP-MCNC: NEGATIVE MG/DL
HGB UR QL STRIP.AUTO: ABNORMAL
HYALINE CASTS UR QL AUTO: ABNORMAL /LPF
KETONES UR QL STRIP: NEGATIVE
LEUKOCYTE ESTERASE UR QL STRIP.AUTO: NEGATIVE
NITRITE UR QL STRIP: NEGATIVE
PH UR STRIP.AUTO: 6 [PH] (ref 5–8)
PROT UR QL STRIP: ABNORMAL
RBC # UR STRIP: ABNORMAL /HPF
REF LAB TEST METHOD: ABNORMAL
SP GR UR STRIP: >1.03 (ref 1–1.03)
SQUAMOUS #/AREA URNS HPF: ABNORMAL /HPF
UROBILINOGEN UR QL STRIP: ABNORMAL
WBC # UR STRIP: ABNORMAL /HPF

## 2025-06-28 PROCEDURE — 83605 ASSAY OF LACTIC ACID: CPT

## 2025-06-28 PROCEDURE — 81001 URINALYSIS AUTO W/SCOPE: CPT | Performed by: EMERGENCY MEDICINE

## 2025-06-28 PROCEDURE — 25810000003 SODIUM CHLORIDE 0.9 % SOLUTION

## 2025-06-28 PROCEDURE — 25510000001 IOPAMIDOL PER 1 ML: Performed by: EMERGENCY MEDICINE

## 2025-06-28 RX ORDER — IOPAMIDOL 755 MG/ML
100 INJECTION, SOLUTION INTRAVASCULAR
Status: COMPLETED | OUTPATIENT
Start: 2025-06-28 | End: 2025-06-28

## 2025-06-28 RX ADMIN — IOPAMIDOL 100 ML: 755 INJECTION, SOLUTION INTRAVENOUS at 00:00

## 2025-06-28 RX ADMIN — SODIUM CHLORIDE 1000 ML: 9 INJECTION, SOLUTION INTRAVENOUS at 00:17

## 2025-06-28 NOTE — ED PROVIDER NOTES
Time: 11:46 PM EDT  Date of encounter:  6/27/2025  Independent Historian/Clinical History and Information was obtained by:   Patient    History is limited by: N/A    Chief Complaint   Patient presents with    Urinary Tract Infection         History of Present Illness:  Patient is a 29 y.o. year old female who presents to the emergency department for evaluation of evaded white count and possible UTI.  Patient states that she was having some UTI symptoms last week so she started taking some Azo.  She also tested positive for the flu on 6/19/2025. she saw her PCP past Tuesday in office who checked lab work and states it was elevated.  On Wednesday she went to her PCP and had a urinalysis and potential UTI.  Patient states due to her being diagnosed with the flu and possible UTI her PCP prescribed her Macrobid and doxycycline.  Patient states since she was diagnosed with the flu she has been on steroids since earlier this week  (6/24/25) and has 2 more days worth.  Patient states she was advised by her PCP to come to the ED for possible IV antibiotics due to elevated white count and possible UTI.  She does states she has been having some lower abdominal pain and right-sided back pain.  Denies history of ovarian cyst.  she denies dysuria, hematuria, difficulty urinating, frequency, fevers.     Patient Care Team  Primary Care Provider: Delfina Mansfield APRN    Past Medical History:     Allergies   Allergen Reactions    Macrobid [Nitrofurantoin] Other (See Comments)    Penicillins Other (See Comments)    Sulfa Antibiotics Other (See Comments)     Past Medical History:   Diagnosis Date    ADHD (attention deficit hyperactivity disorder) April 2024    Allergic     PCN, Sulfa    Allergic rhinitis     Anxiety     Bipolar disorder April 2024    Bulimia nervosa started in high school    2012    Chronic migraine     Cluster headache     Depression     Deviated nasal septum     Disease of thyroid gland Graves Disease    2021     Headache, tension-type     Hyperthyroidism     Graves Diseases     Hypertrophy of both inferior nasal turbinates     IUD (intrauterine device) in place     Myofascial muscle pain     PTSD (post-traumatic stress disorder)     Complex ptsd    Recurrent acute sinusitis     Suicide attempt 10/15/2015    Tendonitis of ankle     TMJ dysfunction      Past Surgical History:   Procedure Laterality Date    ABDOMINAL SURGERY  2021         SECTION      GANGLION CYST EXCISION       Family History   Problem Relation Age of Onset    Heart disease Mother     Diabetes Mother     ADD / ADHD Mother     Anxiety disorder Mother     Bipolar disorder Mother     Depression Mother     Drug abuse Mother     Seizures Mother     Suicide Attempts Mother     Hypertension Mother     Migraines Mother     Mental illness Mother     Heart attack Mother     Heart disease Father     Diabetes Father     Alcohol abuse Father     Anxiety disorder Father     Depression Father     Hypertension Father     Asthma Father     Hyperlipidemia Father     Arthritis Father     COPD Father     No Known Problems Sister     No Known Problems Brother     No Known Problems Maternal Aunt     No Known Problems Paternal Aunt     No Known Problems Maternal Uncle     No Known Problems Paternal Uncle     Alcohol abuse Maternal Grandfather     Heart disease Maternal Grandmother     Thyroid disease Maternal Grandmother     Dementia Maternal Grandmother     No Known Problems Paternal Grandfather     Stroke Paternal Grandmother     Heart disease Paternal Grandmother     Diabetes Paternal Grandmother     Anxiety disorder Paternal Grandmother     Dementia Paternal Grandmother     No Known Problems Cousin     OCD Neg Hx     Paranoid behavior Neg Hx     Schizophrenia Neg Hx     Self-Injurious Behavior  Neg Hx        Home Medications:  Prior to Admission medications    Medication Sig Start Date End Date Taking? Authorizing Provider    amphetamine-dextroamphetamine (Adderall) 10 MG tablet Take 1 tablet by mouth Daily for 30 days. 6/16/25 7/16/25  Olga Jiang APRN   amphetamine-dextroamphetamine XR (Adderall XR) 20 MG 24 hr capsule Take 1 capsule by mouth Every Morning for 30 days 6/28/25 7/28/25  Olga Jiang APRN   benzonatate (Tessalon Perles) 100 MG capsule Take 2 capsules by mouth 3 (Three) Times a Day As Needed for Cough for up to 7 days. 6/25/25 7/2/25  Michelle Moncada APRN   brompheniramine-pseudoephedrine-DM 30-2-10 MG/5ML syrup Take 5 mL by mouth 4 (Four) Times a Day As Needed for Cough. 6/25/25   Michelle Moncada APRN   cetirizine (zyrTEC) 10 MG tablet Zyrtec 10 mg oral tablet take 1 tablet (10 mg) by oral route once daily   Active    Provider, MD Dominga   doxycycline (VIBRAMYCIN) 100 MG capsule Take 1 capsule by mouth 2 (Two) Times a Day for 10 days. 6/26/25 7/6/25  Michlele Moncada APRN   fluconazole (Diflucan) 150 MG tablet Take 1 tablet by mouth 1 (One) Time for 1 dose. 6/26/25 6/26/25  Michelle Moncada APRN   FLUoxetine (PROzac) 10 MG capsule Take 1 capsule by mouth Daily for 180 days. Take with current 40 mg po dose to equal 50 mg po q day. 6/16/25 12/13/25  Olga Jiang APRN   FLUoxetine (PROzac) 40 MG capsule  1/1/23   ProviderDominga MD   fluticasone (FLONASE) 50 MCG/ACT nasal spray 2 sprays into the nostril(s) as directed by provider Daily. 8/27/24   Jen Tabares DO   folic acid (FOLVITE) 1 MG tablet Take 1 tablet by mouth Daily. 6/9/25   Delfina Mansifeld APRN   methylPREDNISolone (MEDROL) 4 MG dose pack Take 1 tablet by mouth Daily. follow package directions 6/24/25   Dominga Werner MD   nitrofurantoin, macrocrystal-monohydrate, (Macrobid) 100 MG capsule Take 1 capsule by mouth 2 (Two) Times a Day. 6/26/25   Michelle Moncada APRN   oseltamivir (Tamiflu) 75 MG capsule Take 1 capsule by mouth 2 (Two) Times a Day.  Patient not taking: Reported on 6/25/2025 6/19/25   Delfina Mansfield APRN  "  propranolol (INDERAL) 20 MG tablet TAKE 0.5 - 1 TABLET BY MOUTH TWICE DAILY 12/23/24   Dominga Werner MD   SEMAGLUTIDE-WEIGHT MANAGEMENT SC Inject  under the skin into the appropriate area as directed. 0.5MG PER INJECTION ONCE PER WEEK    Dominga Werner MD   valACYclovir (VALTREX) 1000 MG tablet Take 2 tablets by mouth Every 12 (Twelve) Hours. 6/20/25   Dominga Werner MD        Social History:   Social History     Tobacco Use    Smoking status: Never     Passive exposure: Past    Smokeless tobacco: Never   Vaping Use    Vaping status: Never Used   Substance Use Topics    Alcohol use: Never    Drug use: Never         Review of Systems:  Review of Systems   Constitutional:  Negative for fever.   Respiratory:  Negative for cough.    Gastrointestinal:  Positive for abdominal pain.   Genitourinary:  Negative for difficulty urinating, dysuria, frequency and hematuria.   Musculoskeletal:  Positive for back pain.        Physical Exam:  /64   Pulse 63   Temp 98.6 °F (37 °C) (Oral)   Resp 18   Ht 167.6 cm (66\")   Wt 76 kg (167 lb 8.8 oz)   LMP 06/01/2025   SpO2 97%   BMI 27.04 kg/m²         Physical Exam  Vitals and nursing note reviewed.   Constitutional:       General: She is not in acute distress.     Appearance: Normal appearance. She is not ill-appearing, toxic-appearing or diaphoretic.   HENT:      Head: Normocephalic and atraumatic.      Nose: Nose normal.      Mouth/Throat:      Mouth: Mucous membranes are moist.   Eyes:      Extraocular Movements: Extraocular movements intact.      Conjunctiva/sclera: Conjunctivae normal.      Pupils: Pupils are equal, round, and reactive to light.   Cardiovascular:      Rate and Rhythm: Normal rate and regular rhythm.      Heart sounds: Normal heart sounds.   Pulmonary:      Effort: Pulmonary effort is normal.      Breath sounds: Normal breath sounds.   Abdominal:      General: Abdomen is flat.      Palpations: Abdomen is soft.      Tenderness: " There is no abdominal tenderness. There is no right CVA tenderness, left CVA tenderness or guarding.   Musculoskeletal:         General: Normal range of motion.      Cervical back: Normal range of motion and neck supple.   Skin:     General: Skin is warm and dry.   Neurological:      General: No focal deficit present.      Mental Status: She is alert and oriented to person, place, and time.   Psychiatric:         Mood and Affect: Mood normal.         Behavior: Behavior normal.                        Medical Decision Making:      Comorbidities that affect care:    Anxiety    External Notes reviewed:    Previous Clinic Note: Office visit with [facility] 6/19/2025 where she was diagnosed with influenza B.  She send saw [facility] again on 6/25/2025 for wheezing., Previous Radiological Studies: Chest x-ray from 6/25/2025 to 6/26/2025 negative, and Previous Labs: CBC from 6/25/2025 showing a WBC of 20 .  CBC from 6/27/2025 earlier this morning showed WBC of 20.64. Urinalysis from earlier today with [facility] 6/27/2025 showing loss of blood, negative for leukocytes and nitrite.  It did show a squamous epithelial cells with 2+ bacteria       The following orders were placed and all results were independently analyzed by me:  Orders Placed This Encounter   Procedures    Blood Culture - Blood,    Blood Culture - Blood,    CT Abdomen Pelvis With Contrast    Comprehensive Metabolic Panel    Urinalysis With Microscopic If Indicated (No Culture) - Urine, Clean Catch    [place] Draw    CBC Auto Differential    hCG, Quantitative, Pregnancy    Lactic Acid, Plasma    STAT Lactic Acid, Reflex    Urinalysis, Microscopic Only - Urine, Clean Catch    CBC & Differential    Green Top (Gel)    Lavender Top    Gold Top - SST    Light Blue Top       Medications Given in the Emergency Department:  Medications   sodium chloride 0.9 % bolus 1,000 mL (1,000 mL Intravenous New Bag 6/28/25 0017)   iopamidol (ISOVUE-370) 76 %  injection 100 mL (100 mL Intravenous Given 6/28/25 0000)        ED Course:    The patient was initially evaluated in the triage area where orders were placed. The patient was later dispositioned by Hadley Ontiveros PA-C.      The patient was advised to stay for completion of workup which includes but is not limited to communication of labs and radiological results, reassessment and plan. The patient was advised that leaving prior to disposition by a provider could result in critical findings that are not communicated to the patient.     ED Course as of 06/28/25 0104   Sat Jun 28, 2025   0100 Discussed all patient's labs and imaging with her.  Discussed urine showing blood in it she states she is probably about to start her cycle.  [place] lactic with her and we will have a repeat lactic but she does not have to stay for the results.  Will discharge patient home to follow-up with PCP and continue taking antibiotics until her culture results back.  She was agreeable with the plan. [AJ]      ED Course User Index  [AJ] Hadley Ontiveros PA-C       Labs:    Lab Results (last 24 hours)       Procedure Component Value Units Date/Time    CBC Auto Differential [[id number]]  (Abnormal) Collected: 06/27/25 0837    Specimen: Blood Updated: 06/27/25 1704     WBC 20.64 10*3/mm3      RBC 4.88 10*6/mm3      Hemoglobin 15.1 g/dL      Hematocrit 45.2 %      MCV 92.6 fL      MCH 30.9 pg      MCHC 33.4 g/dL      RDW 11.3 %      RDW-SD 38.0 fl      MPV 10.9 fL      Platelets 347 10*3/mm3      Neutrophil % 78.6 %      Lymphocyte % 13.9 %      Monocyte % 6.6 %      Eosinophil % 0.1 %      Basophil % 0.2 %      Immature Grans % 0.6 %      Neutrophils, Absolute 16.20 10*3/mm3      Lymphocytes, Absolute 2.86 10*3/mm3      Monocytes, Absolute 1.37 10*3/mm3      Eosinophils, Absolute 0.03 10*3/mm3      Basophils, Absolute 0.05 10*3/mm3      Immature Grans, Absolute 0.13 10*3/mm3      nRBC 0.0 /100 WBC     Comprehensive Metabolic Panel  [[id number]] Collected: 06/27/25 0837    Specimen: Blood Updated: 06/27/25 1751     Glucose 81 mg/dL      BUN 16.0 mg/dL      Creatinine 0.83 mg/dL      Sodium 142 mmol/L      Potassium 4.6 mmol/L      Chloride 106 mmol/L      CO2 22.7 mmol/L      Calcium 9.4 mg/dL      Total Protein 7.2 g/dL      Albumin 4.6 g/dL      ALT (SGPT) 11 U/L      AST (SGOT) 13 U/L      Alkaline Phosphatase 62 U/L      Total Bilirubin 0.6 mg/dL      Globulin 2.6 gm/dL      A/G Ratio 1.8 g/dL      BUN/Creatinine Ratio 19.3     Anion Gap 13.3 mmol/L      eGFR 96.8 mL/min/1.73     Narrative:      GFR Categories in Chronic Kidney Disease (CKD)              GFR Category          GFR (mL/min/1.73)    Interpretation  G1                    90 or greater        Normal or high (1)  G2                    60-89                Mild decrease (1)  G3a                   45-59                Mild to moderate decrease  G3b                   30-44                Moderate to severe decrease  G4                    15-29                Severe decrease  G5                    14 or less           Kidney failure    (1)In the absence of evidence of kidney disease, neither GFR category G1 or G2 fulfill the criteria for CKD.    eGFR calculation 2021 CKD-EPI creatinine equation, which does not include race as a factor    EBV Antibody Profile [[id number]] Collected: 06/27/25 0837    Specimen: Blood Updated: 06/27/25 0837    Urinalysis With Culture If Indicated - Urine, Clean Catch [[id number]]  (Abnormal) Collected: 06/27/25 0837    Specimen: Urine, Clean Catch Updated: 06/27/25 1721     Color, UA Dark Yellow     Appearance, UA Cloudy     pH, UA 6.0     Specific Gravity, UA >1.030     Glucose, UA Negative     Ketones, UA Trace     Bilirubin, UA Negative     Blood, UA Moderate (2+)     Protein,  mg/dL (2+)     Leuk Esterase, UA Negative     Nitrite, UA Negative     Urobilinogen, UA 1.0 E.U./dL    Narrative:      In absence of clinical symptoms, the presence of  pyuria, bacteria, and/or nitrites on the urinalysis result does not correlate with infection.    [place] Urine Culture Tube - Urine, Clean Catch [[id number]] Collected: 06/27/25 0837    Specimen: Urine, Clean Catch Updated: 06/27/25 1655     Extra Tube Hold for add-ons.     Comment: Auto resulted.       Urinalysis, Microscopic Only - Urine, Clean Catch [[id number]]  (Abnormal) Collected: 06/27/25 0837    Specimen: Urine, Clean Catch Updated: 06/27/25 1721     RBC, UA 11-20 /HPF      WBC, UA 0-2 /HPF      Bacteria, UA 2+ /HPF      Squamous Epithelial Cells, UA 3-6 /HPF      Hyaline Casts, UA 3-6 /LPF      Methodology Automated Microscopy    CBC & Differential [[id number]]  (Abnormal) Collected: 06/27/25 2257    Specimen: Blood Updated: 06/27/25 2304    Narrative:      The following orders were created for panel order CBC & Differential.  Procedure                               Abnormality         Status                     ---------                               -----------         ------                     CBC Auto Differential[[id number]]        Abnormal            Final result                 Please view results for these tests on the individual orders.    Comprehensive Metabolic Panel [[id number]]  (Abnormal) Collected: 06/27/25 2257    Specimen: Blood Updated: 06/27/25 2326     Glucose 110 mg/dL      BUN 15.0 mg/dL      Creatinine 0.77 mg/dL      Sodium 140 mmol/L      Potassium 3.8 mmol/L      Comment: Slight hemolysis detected by analyzer. Result may be falsely elevated.        Chloride 104 mmol/L      CO2 24.1 mmol/L      Calcium 9.2 mg/dL      Total Protein 7.2 g/dL      Albumin 4.6 g/dL      ALT (SGPT) 22 U/L      AST (SGOT) 21 U/L      Comment: Slight hemolysis detected by analyzer. Result may be falsely elevated.        Alkaline Phosphatase 68 U/L      Total Bilirubin 0.4 mg/dL      Globulin 2.6 gm/dL      A/G Ratio 1.8 g/dL      BUN/Creatinine Ratio 19.5     Anion Gap 11.9 mmol/L      eGFR 105.9 mL/min/1.73      Narrative:      GFR Categories in Chronic Kidney Disease (CKD)              GFR Category          GFR (mL/min/1.73)    Interpretation  G1                    90 or greater        Normal or high (1)  G2                    60-89                Mild decrease (1)  G3a                   45-59                Mild to moderate decrease  G3b                   30-44                Moderate to severe decrease  G4                    15-29                Severe decrease  G5                    14 or less           Kidney failure    (1)In the absence of evidence of kidney disease, neither GFR category G1 or G2 fulfill the criteria for CKD.    eGFR calculation 2021 CKD-EPI creatinine equation, which does not include race as a factor    CBC Auto Differential [[id number]]  (Abnormal) Collected: 06/27/25 2257    Specimen: Blood Updated: 06/27/25 2304     WBC 16.89 10*3/mm3      RBC 4.68 10*6/mm3      Hemoglobin 14.4 g/dL      Hematocrit 42.6 %      MCV 91.0 fL      MCH 30.8 pg      MCHC 33.8 g/dL      RDW 11.5 %      RDW-SD 38.1 fl      MPV 10.2 fL      Platelets 312 10*3/mm3      Neutrophil % 79.1 %      Lymphocyte % 11.4 %      Monocyte % 8.3 %      Eosinophil % 0.2 %      Basophil % 0.2 %      Immature Grans % 0.8 %      Neutrophils, Absolute 13.36 10*3/mm3      Lymphocytes, Absolute 1.92 10*3/mm3      Monocytes, Absolute 1.41 10*3/mm3      Eosinophils, Absolute 0.03 10*3/mm3      Basophils, Absolute 0.04 10*3/mm3      Immature Grans, Absolute 0.13 10*3/mm3      nRBC 0.0 /100 WBC     hCG, Quantitative, Pregnancy [[id number]] Collected: 06/27/25 2257    Specimen: Blood Updated: 06/27/25 2328     HCG Quantitative <0.50 mIU/mL     Narrative:      HCG Ranges by Gestational Age    Females - non-pregnant premenopausal   </= 1mIU/mL HCG  Females - postmenopausal               </= 7mIU/mL HCG    3 Weeks       5.4   -      72 mIU/mL  4 Weeks      10.2   -     708 mIU/mL  5 Weeks       217   -   8,245 mIU/mL  6 Weeks       152   -  32,177  mIU/mL  7 Weeks     4,059   - 153,767 mIU/mL  8 Weeks    31,366   - 149,094 mIU/mL  9 Weeks    59,109   - 135,901 mIU/mL  10 Weeks   44,186   - 170,409 mIU/mL  12 Weeks   27,107   - 201,615 mIU/mL  14 Weeks   24,302   -  93,646 mIU/mL  15 Weeks   12,540   -  69,747 mIU/mL  16 Weeks    8,904   -  55,332 mIU/mL  17 Weeks    8,240   -  51,793 mIU/mL  18 Weeks    9,649   -  55,271 mIU/mL      Lactic Acid, Plasma [[id number]]  (Abnormal) Collected: 06/27/25 2327    Specimen: Blood Updated: 06/27/25 2356     Lactate 2.5 mmol/L     Blood Culture - Blood, Arm, Right [[id number]] Collected: 06/27/25 2336    Specimen: Blood from Arm, Right Updated: 06/27/25 2341    Blood Culture - Blood, Arm, Left [[id number]] Collected: 06/27/25 2336    Specimen: Blood from Arm, Left Updated: 06/27/25 2340    Urinalysis With Microscopic If Indicated (No Culture) - Urine, Clean Catch [[id number]]  (Abnormal) Collected: 06/28/25 0020    Specimen: Urine, Clean Catch Updated: 06/28/25 0054     Color, UA Yellow     Appearance, UA Clear     pH, UA 6.0     Specific Gravity, UA >1.030     Glucose, UA Negative     Ketones, UA Negative     Bilirubin, UA Negative     Blood, UA Moderate (2+)     Protein, UA Trace     Leuk Esterase, UA Negative     Nitrite, UA Negative     Urobilinogen, UA 0.2 E.U./dL    Urinalysis, Microscopic Only - Urine, Clean Catch [[id number]]  (Abnormal) Collected: 06/28/25 0020    Specimen: Urine, Clean Catch Updated: 06/28/25 0055     RBC, UA 11-20 /HPF      WBC, UA 0-2 /HPF      Bacteria, UA None Seen /HPF      Squamous Epithelial Cells, UA 0-2 /HPF      Hyaline Casts, UA None Seen /LPF      Methodology Automated Microscopy             Imaging:    CT Abdomen Pelvis With Contrast  Result Date: 6/28/2025  CT ABDOMEN PELVIS W CONTRAST-  Date of exam: 6/27/2025 11:53 PM.  Comparison: 4/22/2018.  INDICATIONS: 29-year-old female w/ h/o lower abd. pain & right-sided back pain; + leukocytosis; possible UTI; + flu syndrome.  TECHNIQUE: Axial  CT images were obtained of the abdomen and pelvis following the uneventful intravenous administration of 75 mL (or less) of Isovue-370 contrast agent. Reconstructed 2D (two-dimensional) coronal and sagittal images were also obtained. Automated exposure control and iterative construction methods were used. Additionally, the radiation dose reduction device was turned on for each scan per the ALARA (As Low as Reasonably Achievable) protocol. No oral contrast agent was administered for the study. Please see the electronic medical record, EMR (i.e., Epic), for the documented dose of intravenous contrast agent as well as the radiation dose.  FINDINGS: No acute cholecystitis or pancreatitis. No gallstones. No biliary ductal dilatation. No mechanical bowel obstruction. No pathologic bowel wall thickening. No pneumoperitoneum or pneumatosis. No portal or mesenteric venous gas. No acute appendicitis, colitis, or diverticulitis. There is mild fluid-distended small bowel. It may represent a generalized adynamic ileus. A mild infectious/inflammatory enteritis is possible. Small-to-moderate-sized mesenteric lymph nodes are seen. They may be reactive in nature. Minimal, if any, bowel wall thickening is seen. The mesenteric arteries are patent. No renal/ureteral stones or hydronephrosis is seen. No obstructive uropathy is identified. No acute pyelonephritis. No ascites. No aneurysmal dilatation of the aortoiliac arterial system. No acute intraperitoneal or retroperitoneal hemorrhage. The urinary bladder is under distended, limiting its assessment. Grossly, no definite significant abnormalities of the urinary bladder are appreciated by CT. Probably no pathologically enlarged intra-abdominal or intrapelvic lymph nodes. No adrenal mass. No acute findings are seen with regard to the liver or spleen. No acute fracture. No aggressive osseous lesion. Sclerotic changes involve the bilateral sacroiliac (SI) joints. No acute infiltrate is  seen in the partially imaged lung bases. No suspicious uterine or adnexal mass. The previously seen intrauterine device (IUD) has been removed. The uterus is anteverted and slightly anteflexed.       There is mild fluid-distended small bowel. It may represent a generalized adynamic ileus. A mild infectious/inflammatory enteritis is possible. Small-to-moderate-sized mesenteric lymph nodes are seen. They may be reactive in nature. Minimal, if any, bowel wall thickening is seen. Otherwise, no acute findings are seen in the abdomen or pelvis by enhanced CT examination. Please see above comments for further detail.     Portions of this note were completed with a voice recognition program.  6/28/2025 12:16 AM by Crispin Krause MD on Workstation: [email or web address]      XR Chest 2 View  Result Date: 6/27/2025  XR CHEST 2 VW Date of Exam: 6/27/2025 8:37 AM EDT Indication: worsening respiratory symptoms Comparison: 6/25/2025 and prior Findings: The heart and mediastinal contours are within normal limits. The lungs are clear. Osseous structures are unremarkable.     Impression: Negative chest Electronically Signed: Dima Chang MD  6/27/2025 9:15 AM EDT  Workstation ID: [id number]        Differential Diagnosis and Discussion:      Dysuria: Differential diagnosis includes but is not limited to urethritis, cystitis, pyelonephritis, ureteral calculi, neoplasm, chemical irritant, urethral stricture, and trauma    PROCEDURES:    Labs were collected in the emergency department and all labs were reviewed and interpreted by me.  CT scan was performed in the emergency department and the CT scan radiology impression was interpreted by me.    No orders to display        Procedures    MDM     Amount and/or Complexity of Data Reviewed  Clinical lab tests: reviewed  Tests in the radiology section of CPT®: reviewed                     Patient Care Considerations:    SEPSIS was considered but is NOT present in the emergency department as SIRS criteria  is not present. ANTIBIOTICS: I considered prescribing antibiotics as an outpatient however no bacterial focus of infection was found.      Consultants/Shared Management Plan:    SHARED VISIT: I have discussed the case with my supervising physician, Dr. White who states close reviewed patient's labs and imaging.. The substantive portion of the medical decision was made by the attesting physician who made or approve the management plan and will take responsibility for the patient.  Clinical findings were discussed and ultimate disposition was made in consult with supervising physician.    Social Determinants of Health:    Patient is independent, reliable, and has access to care.       Disposition and Care Coordination:    Discharged: I considered escalation of care by admitting this patient to the hospital, however patient is not septic.  CT scan negative    I have explained the patient´s condition, diagnoses and treatment plan based on the information available to me at this time. I have answered questions and addressed any concerns. The patient has a good  understanding of the patient´s diagnosis, condition, and treatment plan as can be expected at this point. The vital signs have been stable. The patient´s condition is stable and appropriate for discharge from the emergency department.      The patient will pursue further outpatient evaluation with the primary care physician or other designated or consulting physician as outlined in the discharge instructions. They are agreeable to this plan of care and follow-up instructions have been explained in detail. The patient has received these instructions in written format and has expressed an understanding of the discharge instructions. The patient is aware that any significant change in condition or worsening of symptoms should prompt an immediate return to this or the closest emergency department or call to 911.    Final diagnoses:   Leukocytosis, unspecified type    Asymptomatic microscopic hematuria        ED Disposition       ED Disposition   Discharge    Condition   Stable    Comment   --               This medical record created using voice recognition software.             Hadley Ontiveros PA-C  06/28/25 0101

## 2025-06-28 NOTE — ED TRIAGE NOTES
Pt comes to the ED tonight for a UTI. Pt states that her PCP did a UA on her this morning and it showed that she had blood in her urine and a UTI. They stated she needed to come here and get IV antibiotics. She is currently taking doxycyline and macrobid.

## 2025-06-28 NOTE — ED PROVIDER NOTES
"SHARED VISIT ATTESTATION:    This visit was performed by myself and an APC.  I personally approved the management plan/medical decision making and take responsibility for the patient management.      SHARED VISIT NOTE:    Patient is 29 y.o. year old female that presents to the ED for evaluation of concern for urinary tract infection.     Physical Exam    ED Course:    /64   Pulse 67   Temp 98.6 °F (37 °C) (Oral)   Resp 18   Ht 167.6 cm (66\")   Wt 76 kg (167 lb 8.8 oz)   LMP 06/01/2025   SpO2 97%   BMI 27.04 kg/m²       The following orders were placed and all results were independently analyzed by me:  Orders Placed This Encounter   Procedures   • Blood Culture - Blood,   • Blood Culture - Blood,   • CT Abdomen Pelvis With Contrast   • Comprehensive Metabolic Panel   • Urinalysis With Microscopic If Indicated (No Culture) - Urine, Clean Catch   • [place] Draw   • CBC Auto Differential   • hCG, Quantitative, Pregnancy   • Lactic Acid, Plasma   • STAT Lactic Acid, Reflex   • CBC & Differential   • Green Top (Gel)   • Lavender Top   • Gold Top - SST   • Light Blue Top       Medications Given in the Emergency Department:  Medications   sodium chloride 0.9 % bolus 1,000 mL (has no administration in time range)   iopamidol (ISOVUE-370) 76 % injection 100 mL (100 mL Intravenous Given 6/28/25 0000)        ED Course:    ED Course as of 06/28/25 0821   Sat Jun 28, 2025   0100 Discussed all patient's labs and imaging with her.  Discussed urine showing blood in it she states she is probably about to start her cycle.  [place] lactic with her and we will have a repeat lactic but she does not have to stay for the results.  Will discharge patient home to follow-up with PCP and continue taking antibiotics until her culture results back.  She was agreeable with the plan. [AJ]      ED Course User Index  [AJ] Hadley Ontiveros PA-C       Labs:    Lab Results (last 24 hours)       Procedure Component Value Units " Date/Time    CBC Auto Differential [[id number]]  (Abnormal) Collected: 06/27/25 0837    Specimen: Blood Updated: 06/27/25 1704     WBC 20.64 10*3/mm3      RBC 4.88 10*6/mm3      Hemoglobin 15.1 g/dL      Hematocrit 45.2 %      MCV 92.6 fL      MCH 30.9 pg      MCHC 33.4 g/dL      RDW 11.3 %      RDW-SD 38.0 fl      MPV 10.9 fL      Platelets 347 10*3/mm3      Neutrophil % 78.6 %      Lymphocyte % 13.9 %      Monocyte % 6.6 %      Eosinophil % 0.1 %      Basophil % 0.2 %      Immature Grans % 0.6 %      Neutrophils, Absolute 16.20 10*3/mm3      Lymphocytes, Absolute 2.86 10*3/mm3      Monocytes, Absolute 1.37 10*3/mm3      Eosinophils, Absolute 0.03 10*3/mm3      Basophils, Absolute 0.05 10*3/mm3      Immature Grans, Absolute 0.13 10*3/mm3      nRBC 0.0 /100 WBC     Comprehensive Metabolic Panel [[id number]] Collected: 06/27/25 0837    Specimen: Blood Updated: 06/27/25 1751     Glucose 81 mg/dL      BUN 16.0 mg/dL      Creatinine 0.83 mg/dL      Sodium 142 mmol/L      Potassium 4.6 mmol/L      Chloride 106 mmol/L      CO2 22.7 mmol/L      Calcium 9.4 mg/dL      Total Protein 7.2 g/dL      Albumin 4.6 g/dL      ALT (SGPT) 11 U/L      AST (SGOT) 13 U/L      Alkaline Phosphatase 62 U/L      Total Bilirubin 0.6 mg/dL      Globulin 2.6 gm/dL      A/G Ratio 1.8 g/dL      BUN/Creatinine Ratio 19.3     Anion Gap 13.3 mmol/L      eGFR 96.8 mL/min/1.73     Narrative:      GFR Categories in Chronic Kidney Disease (CKD)              GFR Category          GFR (mL/min/1.73)    Interpretation  G1                    90 or greater        Normal or high (1)  G2                    60-89                Mild decrease (1)  G3a                   45-59                Mild to moderate decrease  G3b                   30-44                Moderate to severe decrease  G4                    15-29                Severe decrease  G5                    14 or less           Kidney failure    (1)In the absence of evidence of kidney disease, neither GFR  category G1 or G2 fulfill the criteria for CKD.    eGFR calculation 2021 CKD-EPI creatinine equation, which does not include race as a factor    EBV Antibody Profile [[id number]] Collected: 06/27/25 0837    Specimen: Blood Updated: 06/27/25 0837    Urinalysis With Culture If Indicated - Urine, Clean Catch [[id number]]  (Abnormal) Collected: 06/27/25 0837    Specimen: Urine, Clean Catch Updated: 06/27/25 1721     Color, UA Dark Yellow     Appearance, UA Cloudy     pH, UA 6.0     Specific Gravity, UA >1.030     Glucose, UA Negative     Ketones, UA Trace     Bilirubin, UA Negative     Blood, UA Moderate (2+)     Protein,  mg/dL (2+)     Leuk Esterase, UA Negative     Nitrite, UA Negative     Urobilinogen, UA 1.0 E.U./dL    Narrative:      In absence of clinical symptoms, the presence of pyuria, bacteria, and/or nitrites on the urinalysis result does not correlate with infection.    [place] Urine Culture Tube - Urine, Clean Catch [[id number]] Collected: 06/27/25 0837    Specimen: Urine, Clean Catch Updated: 06/27/25 1655     Extra Tube Hold for add-ons.     Comment: Auto resulted.       Urinalysis, Microscopic Only - Urine, Clean Catch [[id number]]  (Abnormal) Collected: 06/27/25 0837    Specimen: Urine, Clean Catch Updated: 06/27/25 1721     RBC, UA 11-20 /HPF      WBC, UA 0-2 /HPF      Bacteria, UA 2+ /HPF      Squamous Epithelial Cells, UA 3-6 /HPF      Hyaline Casts, UA 3-6 /LPF      Methodology Automated Microscopy    CBC & Differential [[id number]]  (Abnormal) Collected: 06/27/25 2257    Specimen: Blood Updated: 06/27/25 2304    Narrative:      The following orders were created for panel order CBC & Differential.  Procedure                               Abnormality         Status                     ---------                               -----------         ------                     CBC Auto Differential[[id number]]        Abnormal            Final result                 Please view results for these tests on  the individual orders.    Comprehensive Metabolic Panel [[id number]]  (Abnormal) Collected: 06/27/25 2257    Specimen: Blood Updated: 06/27/25 2326     Glucose 110 mg/dL      BUN 15.0 mg/dL      Creatinine 0.77 mg/dL      Sodium 140 mmol/L      Potassium 3.8 mmol/L      Comment: Slight hemolysis detected by analyzer. Result may be falsely elevated.        Chloride 104 mmol/L      CO2 24.1 mmol/L      Calcium 9.2 mg/dL      Total Protein 7.2 g/dL      Albumin 4.6 g/dL      ALT (SGPT) 22 U/L      AST (SGOT) 21 U/L      Comment: Slight hemolysis detected by analyzer. Result may be falsely elevated.        Alkaline Phosphatase 68 U/L      Total Bilirubin 0.4 mg/dL      Globulin 2.6 gm/dL      A/G Ratio 1.8 g/dL      BUN/Creatinine Ratio 19.5     Anion Gap 11.9 mmol/L      eGFR 105.9 mL/min/1.73     Narrative:      GFR Categories in Chronic Kidney Disease (CKD)              GFR Category          GFR (mL/min/1.73)    Interpretation  G1                    90 or greater        Normal or high (1)  G2                    60-89                Mild decrease (1)  G3a                   45-59                Mild to moderate decrease  G3b                   30-44                Moderate to severe decrease  G4                    15-29                Severe decrease  G5                    14 or less           Kidney failure    (1)In the absence of evidence of kidney disease, neither GFR category G1 or G2 fulfill the criteria for CKD.    eGFR calculation 2021 CKD-EPI creatinine equation, which does not include race as a factor    CBC Auto Differential [[id number]]  (Abnormal) Collected: 06/27/25 2257    Specimen: Blood Updated: 06/27/25 2304     WBC 16.89 10*3/mm3      RBC 4.68 10*6/mm3      Hemoglobin 14.4 g/dL      Hematocrit 42.6 %      MCV 91.0 fL      MCH 30.8 pg      MCHC 33.8 g/dL      RDW 11.5 %      RDW-SD 38.1 fl      MPV 10.2 fL      Platelets 312 10*3/mm3      Neutrophil % 79.1 %      Lymphocyte % 11.4 %      Monocyte % 8.3 %       Eosinophil % 0.2 %      Basophil % 0.2 %      Immature Grans % 0.8 %      Neutrophils, Absolute 13.36 10*3/mm3      Lymphocytes, Absolute 1.92 10*3/mm3      Monocytes, Absolute 1.41 10*3/mm3      Eosinophils, Absolute 0.03 10*3/mm3      Basophils, Absolute 0.04 10*3/mm3      Immature Grans, Absolute 0.13 10*3/mm3      nRBC 0.0 /100 WBC     hCG, Quantitative, Pregnancy [[id number]] Collected: 06/27/25 2257    Specimen: Blood Updated: 06/27/25 2328     HCG Quantitative <0.50 mIU/mL     Narrative:      HCG Ranges by Gestational Age    Females - non-pregnant premenopausal   </= 1mIU/mL HCG  Females - postmenopausal               </= 7mIU/mL HCG    3 Weeks       5.4   -      72 mIU/mL  4 Weeks      10.2   -     708 mIU/mL  5 Weeks       217   -   8,245 mIU/mL  6 Weeks       152   -  32,177 mIU/mL  7 Weeks     4,059   - 153,767 mIU/mL  8 Weeks    31,366   - 149,094 mIU/mL  9 Weeks    59,109   - 135,901 mIU/mL  10 Weeks   44,186   - 170,409 mIU/mL  12 Weeks   27,107   - 201,615 mIU/mL  14 Weeks   24,302   -  93,646 mIU/mL  15 Weeks   12,540   -  69,747 mIU/mL  16 Weeks    8,904   -  55,332 mIU/mL  17 Weeks    8,240   -  51,793 mIU/mL  18 Weeks    9,649   -  55,271 mIU/mL      Lactic Acid, Plasma [[id number]]  (Abnormal) Collected: 06/27/25 2327    Specimen: Blood Updated: 06/27/25 2356     Lactate 2.5 mmol/L     Blood Culture - Blood, Arm, Right [[id number]] Collected: 06/27/25 2336    Specimen: Blood from Arm, Right Updated: 06/27/25 2341    Blood Culture - Blood, Arm, Left [[id number]] Collected: 06/27/25 2336    Specimen: Blood from Arm, Left Updated: 06/27/25 2340             Imaging:    XR Chest 2 View  Result Date: 6/27/2025  XR CHEST 2 VW Date of Exam: 6/27/2025 8:37 AM EDT Indication: worsening respiratory symptoms Comparison: 6/25/2025 and prior Findings: The heart and mediastinal contours are within normal limits. The lungs are clear. Osseous structures are unremarkable.     Impression: Negative chest  Electronically Signed: Dima Chang MD  6/27/2025 9:15 AM EDT  Workstation ID: [id number]      MDM:    Oskar White MD  00:16 EDT  06/28/25         Kushal White MD  06/28/25 0821

## 2025-06-28 NOTE — ED NOTES
This RN called pt to inform them of increase in lactic acid value, per Provider. Initially phone rang to voicemail. Voicemail left instructing pt to call back to ED regarding lab result, and left ED phone number.    Pt called back immediately. Pt was informed of the increase in her lactic acid and to come back in if she starts feeling worse or isn't getting better, per Provider. Pt verbalized understanding.

## 2025-06-28 NOTE — DISCHARGE INSTRUCTIONS
Your urine was negative for UTI but it does show that you have microscopic blood in it.  Your white count went from 20 this morning to 16 during your ED visit, this is most likely caused from the steroids you are currently taking.  All other lab work showed no concerning abnormalities.  Your CT scan was negative for acute findings.  Please continue antibiotics prescribed by your PCP and follow-up with your PCP in for urine culture results.

## 2025-06-30 LAB
EBV NA IGG SER IA-ACNC: >600 U/ML (ref 0–17.9)
EBV VCA IGG SER IA-ACNC: 110 U/ML (ref 0–17.9)
EBV VCA IGM SER IA-ACNC: <36 U/ML (ref 0–35.9)
SERVICE CMNT-IMP: ABNORMAL

## 2025-07-02 ENCOUNTER — OFFICE VISIT (OUTPATIENT)
Dept: FAMILY MEDICINE CLINIC | Facility: CLINIC | Age: 30
End: 2025-07-02
Payer: COMMERCIAL

## 2025-07-02 VITALS
SYSTOLIC BLOOD PRESSURE: 110 MMHG | HEIGHT: 66 IN | BODY MASS INDEX: 26.36 KG/M2 | OXYGEN SATURATION: 99 % | DIASTOLIC BLOOD PRESSURE: 62 MMHG | HEART RATE: 101 BPM | WEIGHT: 164 LBS | TEMPERATURE: 97.6 F

## 2025-07-02 DIAGNOSIS — T78.40XD ALLERGY, SUBSEQUENT ENCOUNTER: ICD-10-CM

## 2025-07-02 DIAGNOSIS — R79.89 INCREASED LACTIC ACID LEVEL: Primary | ICD-10-CM

## 2025-07-02 DIAGNOSIS — R52 BODY ACHES: ICD-10-CM

## 2025-07-02 DIAGNOSIS — D72.829 LEUKOCYTOSIS, UNSPECIFIED TYPE: ICD-10-CM

## 2025-07-02 LAB
ALBUMIN SERPL-MCNC: 4 G/DL (ref 3.5–5.2)
ALBUMIN/GLOB SERPL: 1.6 G/DL
ALP SERPL-CCNC: 59 U/L (ref 39–117)
ALT SERPL W P-5'-P-CCNC: 22 U/L (ref 1–33)
ANION GAP SERPL CALCULATED.3IONS-SCNC: 14 MMOL/L (ref 5–15)
AST SERPL-CCNC: 18 U/L (ref 1–32)
BACTERIA SPEC AEROBE CULT: NORMAL
BACTERIA SPEC AEROBE CULT: NORMAL
BACTERIA UR QL AUTO: ABNORMAL /HPF
BACTERIAL VAGINOSIS VAG-IMP: NEGATIVE
BASOPHILS # BLD AUTO: 0.05 10*3/MM3 (ref 0–0.2)
BASOPHILS NFR BLD AUTO: 0.5 % (ref 0–1.5)
BILIRUB SERPL-MCNC: 1.1 MG/DL (ref 0–1.2)
BILIRUB UR QL STRIP: NEGATIVE
BUN SERPL-MCNC: 11 MG/DL (ref 6–20)
BUN/CREAT SERPL: 14.7 (ref 7–25)
CALCIUM SPEC-SCNC: 8.6 MG/DL (ref 8.6–10.5)
CANDIDA DNA VAG QL NAA+PROBE: NOT DETECTED
CANDIDA DNA VAG QL NAA+PROBE: NOT DETECTED
CHLORIDE SERPL-SCNC: 104 MMOL/L (ref 98–107)
CLARITY UR: CLEAR
CO2 SERPL-SCNC: 22 MMOL/L (ref 22–29)
COD CRY URNS QL: PRESENT /HPF
COLOR UR: ABNORMAL
CREAT SERPL-MCNC: 0.75 MG/DL (ref 0.57–1)
D-LACTATE SERPL-SCNC: 1 MMOL/L (ref 0.5–2)
DEPRECATED RDW RBC AUTO: 40.2 FL (ref 37–54)
EGFRCR SERPLBLD CKD-EPI 2021: 109.3 ML/MIN/1.73
EOSINOPHIL # BLD AUTO: 0.19 10*3/MM3 (ref 0–0.4)
EOSINOPHIL NFR BLD AUTO: 2.1 % (ref 0.3–6.2)
ERYTHROCYTE [DISTWIDTH] IN BLOOD BY AUTOMATED COUNT: 11.8 % (ref 12.3–15.4)
GLOBULIN UR ELPH-MCNC: 2.5 GM/DL
GLUCOSE SERPL-MCNC: 78 MG/DL (ref 65–99)
GLUCOSE UR STRIP-MCNC: NEGATIVE MG/DL
HCT VFR BLD AUTO: 40 % (ref 34–46.6)
HGB BLD-MCNC: 13.6 G/DL (ref 12–15.9)
HGB UR QL STRIP.AUTO: ABNORMAL
HOLD SPECIMEN: NORMAL
HYALINE CASTS UR QL AUTO: ABNORMAL /LPF
IMM GRANULOCYTES # BLD AUTO: 0.07 10*3/MM3 (ref 0–0.05)
IMM GRANULOCYTES NFR BLD AUTO: 0.8 % (ref 0–0.5)
KETONES UR QL STRIP: NEGATIVE
LEUKOCYTE ESTERASE UR QL STRIP.AUTO: NEGATIVE
LYMPHOCYTES # BLD AUTO: 2.1 10*3/MM3 (ref 0.7–3.1)
LYMPHOCYTES NFR BLD AUTO: 22.9 % (ref 19.6–45.3)
MCH RBC QN AUTO: 31.5 PG (ref 26.6–33)
MCHC RBC AUTO-ENTMCNC: 34 G/DL (ref 31.5–35.7)
MCV RBC AUTO: 92.6 FL (ref 79–97)
MONOCYTES # BLD AUTO: 0.8 10*3/MM3 (ref 0.1–0.9)
MONOCYTES NFR BLD AUTO: 8.7 % (ref 5–12)
NEUTROPHILS NFR BLD AUTO: 5.95 10*3/MM3 (ref 1.7–7)
NEUTROPHILS NFR BLD AUTO: 65 % (ref 42.7–76)
NITRITE UR QL STRIP: NEGATIVE
NRBC BLD AUTO-RTO: 0 /100 WBC (ref 0–0.2)
PH UR STRIP.AUTO: 6.5 [PH] (ref 5–8)
PLATELET # BLD AUTO: 280 10*3/MM3 (ref 140–450)
PMV BLD AUTO: 10.5 FL (ref 6–12)
POTASSIUM SERPL-SCNC: 3.5 MMOL/L (ref 3.5–5.2)
PROT SERPL-MCNC: 6.5 G/DL (ref 6–8.5)
PROT UR QL STRIP: ABNORMAL
RBC # BLD AUTO: 4.32 10*6/MM3 (ref 3.77–5.28)
RBC # UR STRIP: ABNORMAL /HPF
REF LAB TEST METHOD: ABNORMAL
SODIUM SERPL-SCNC: 140 MMOL/L (ref 136–145)
SP GR UR STRIP: 1.02 (ref 1–1.03)
SQUAMOUS #/AREA URNS HPF: ABNORMAL /HPF
T VAGINALIS DNA VAG QL NAA+PROBE: NOT DETECTED
UROBILINOGEN UR QL STRIP: ABNORMAL
WBC # UR STRIP: ABNORMAL /HPF
WBC NRBC COR # BLD AUTO: 9.16 10*3/MM3 (ref 3.4–10.8)

## 2025-07-02 PROCEDURE — 83605 ASSAY OF LACTIC ACID: CPT

## 2025-07-02 PROCEDURE — 81001 URINALYSIS AUTO W/SCOPE: CPT

## 2025-07-02 PROCEDURE — 80053 COMPREHEN METABOLIC PANEL: CPT

## 2025-07-02 PROCEDURE — 81515 NFCT DS BV&VAGINITIS DNA ALG: CPT

## 2025-07-02 PROCEDURE — 85025 COMPLETE CBC W/AUTO DIFF WBC: CPT

## 2025-07-02 RX ORDER — AZELASTINE 1 MG/ML
2 SPRAY, METERED NASAL 2 TIMES DAILY
Qty: 30 ML | Refills: 12 | Status: SHIPPED | OUTPATIENT
Start: 2025-07-02

## 2025-07-02 NOTE — PROGRESS NOTES
..  Venipuncture Blood Specimen Collection  Venipuncture performed in LT arm by Olga Gruber MA with good hemostasis. Patient tolerated the procedure well without complications.   07/02/25   Olga Gruber MA

## 2025-07-02 NOTE — PROGRESS NOTES
Chief Complaint  Urinary Tract Infection (Follow up from UTI and congestion. Fatigue and sore muscle)    The PHQ has not been completed during this encounter.         History of Present Illness:  Christin Morris is a 29 y.o. female who presents to [facility] with a past medical history of  Past Medical History:   Diagnosis Date    ADHD (attention deficit hyperactivity disorder) April 2024    Allergic     PCN, Sulfa    Allergic rhinitis     Anxiety     Bipolar disorder April 2024    Bulimia nervosa started in high school    2012    Chronic migraine     Cluster headache     Depression     Deviated nasal septum     Disease of thyroid gland Graves Disease    2021    Headache, tension-type     Hyperthyroidism     Graves Diseases 2022    Hypertrophy of both inferior nasal turbinates     IUD (intrauterine device) in place     Myofascial muscle pain     PTSD (post-traumatic stress disorder)     Complex ptsd    Recurrent acute sinusitis     Suicide attempt 10/15/2015    Tendonitis of ankle     TMJ dysfunction         History of Present Illness  The patient is a 29-year-old female who presents today for a hospital follow-up.    She reports that her respiratory condition has improved, but she continues to experience muscle aches and fatigue, which she attributes to elevated lactic acid levels. She was informed that there was no bacterial infection and that the presence of blood in her urine could be due to her menstrual cycle, which started two days after the test. A blood culture was performed, but no urine culture was conducted. She was discharged from the hospital after her lactic acid levels were rechecked, but she was advised to return if her condition worsened or did not improve. Despite these symptoms, she is able to work. She has discontinued her steroid medication and reports no fevers. She has been maintaining hydration and attempting to eat, although her appetite has not fully  "returned. She has been snacking to maintain her energy levels.    She also reports allergies and hoarseness, which she believes are due to postnasal congestion. She takes cetirizine for allergies, which causes drowsiness, so she takes it at night. She occasionally experiences recurrent sinus infections.    She has noticed a reddish-purple discoloration in her feet when sitting but reports no numbness or tingling. However, she does experience dizziness upon standing.      Objective   Vital Signs:   Vitals:    07/02/25 0914   BP: 110/62   Pulse: 101   Temp: 97.6 °F (36.4 °C)   TempSrc: Temporal   SpO2: 99%   Weight: 74.4 kg (164 lb)   Height: 167.6 cm (66\")   PainSc: 2      Body mass index is 26.47 kg/m².    Wt Readings from Last 3 Encounters:   07/02/25 74.4 kg (164 lb)   06/27/25 76 kg (167 lb 8.8 oz)   06/25/25 75.6 kg (166 lb 9.6 oz)     BP Readings from Last 3 Encounters:   07/02/25 110/62   06/28/25 105/69   06/25/25 128/82       Health Maintenance   Topic Date Due    ANNUAL PHYSICAL  06/25/2025    INFLUENZA VACCINE  12/29/2025 (Originally 7/1/2025)    PAP SMEAR  06/09/2028    TDAP/TD VACCINES (3 - Td or Tdap) 11/16/2030    HEPATITIS C SCREENING  Completed    COVID-19 Vaccine  Completed    Pneumococcal Vaccine 0-49  Aged Out       Review of Systems   Physical Exam  Vitals reviewed.   Constitutional:       Appearance: Normal appearance.   Eyes:      Pupils: Pupils are equal, round, and reactive to light.   Cardiovascular:      Rate and Rhythm: Normal rate and regular rhythm.   Pulmonary:      Effort: Pulmonary effort is normal.      Breath sounds: Normal breath sounds.   Skin:     General: Skin is warm and dry.   Neurological:      General: No focal deficit present.      Mental Status: She is alert and oriented to person, place, and time.   Psychiatric:         Mood and Affect: Mood normal.            Result Review :  The following data was reviewed by: ANTONIA Prakash on 07/02/2025:  Office Visit on " 07/02/2025   Component Date Value    Lactate 07/02/2025 1.0     WBC 07/02/2025 9.16     RBC 07/02/2025 4.32     Hemoglobin 07/02/2025 13.6     Hematocrit 07/02/2025 40.0     MCV 07/02/2025 92.6     MCH 07/02/2025 31.5     MCHC 07/02/2025 34.0     RDW 07/02/2025 11.8 (L)     RDW-SD 07/02/2025 40.2     MPV 07/02/2025 10.5     Platelets 07/02/2025 280     Neutrophil % 07/02/2025 65.0     Lymphocyte % 07/02/2025 22.9     Monocyte % 07/02/2025 8.7     Eosinophil % 07/02/2025 2.1     Basophil % 07/02/2025 0.5     Immature Grans % 07/02/2025 0.8 (H)     Neutrophils, Absolute 07/02/2025 5.95     Lymphocytes, Absolute 07/02/2025 2.10     Monocytes, Absolute 07/02/2025 0.80     Eosinophils, Absolute 07/02/2025 0.19     Basophils, Absolute 07/02/2025 0.05     Immature Grans, Absolute 07/02/2025 0.07 (H)     nRBC 07/02/2025 0.0     Glucose 07/02/2025 78     BUN 07/02/2025 11.0     Creatinine 07/02/2025 0.75     Sodium 07/02/2025 140     Potassium 07/02/2025 3.5     Chloride 07/02/2025 104     CO2 07/02/2025 22.0     Calcium 07/02/2025 8.6     Total Protein 07/02/2025 6.5     Albumin 07/02/2025 4.0     ALT (SGPT) 07/02/2025 22     AST (SGOT) 07/02/2025 18     Alkaline Phosphatase 07/02/2025 59     Total Bilirubin 07/02/2025 1.1     Globulin 07/02/2025 2.5     A/G Ratio 07/02/2025 1.6     BUN/Creatinine Ratio 07/02/2025 14.7     Anion Gap 07/02/2025 14.0     eGFR 07/02/2025 109.3     Bacterial vaginosis 07/02/2025 Negative     Candida glabrata/krusei 07/02/2025 Not Detected     JENNA GROUP 07/02/2025 Not Detected     Trichomonas vaginalis 07/02/2025 Not Detected     Color, UA 07/02/2025 Dark Yellow (A)     Appearance, UA 07/02/2025 Clear     pH, UA 07/02/2025 6.5     Specific Gravity, UA 07/02/2025 1.024     Glucose, UA 07/02/2025 Negative     Ketones, UA 07/02/2025 Negative     Bilirubin, UA 07/02/2025 Negative     Blood, UA 07/02/2025 Small (1+) (A)     Protein, UA 07/02/2025 Trace (A)     Leuk Esterase, UA 07/02/2025  Negative     Nitrite, UA 07/02/2025 Negative     Urobilinogen, UA 07/02/2025 1.0 E.U./dL     Extra Tube 07/02/2025 Hold for add-ons.     RBC, UA 07/02/2025 11-20 (A)     WBC, UA 07/02/2025 0-2     Bacteria, UA 07/02/2025 None Seen     Squamous Epithelial Cell* 07/02/2025 0-2     Hyaline Casts, UA 07/02/2025 None Seen     Calcium Oxalate Crystals* 07/02/2025 Present     Methodology 07/02/2025 Automated Microscopy    Admission on 06/27/2025, Discharged on 06/28/2025   Component Date Value    Glucose 06/27/2025 110 (H)     BUN 06/27/2025 15.0     Creatinine 06/27/2025 0.77     Sodium 06/27/2025 140     Potassium 06/27/2025 3.8     Chloride 06/27/2025 104     CO2 06/27/2025 24.1     Calcium 06/27/2025 9.2     Total Protein 06/27/2025 7.2     Albumin 06/27/2025 4.6     ALT (SGPT) 06/27/2025 22     AST (SGOT) 06/27/2025 21     Alkaline Phosphatase 06/27/2025 68     Total Bilirubin 06/27/2025 0.4     Globulin 06/27/2025 2.6     A/G Ratio 06/27/2025 1.8     BUN/Creatinine Ratio 06/27/2025 19.5     Anion Gap 06/27/2025 11.9     eGFR 06/27/2025 105.9     Color, UA 06/28/2025 Yellow     Appearance, UA 06/28/2025 Clear     pH, UA 06/28/2025 6.0     Specific Saline, UA 06/28/2025 >1.030 (H)     Glucose, UA 06/28/2025 Negative     Ketones, UA 06/28/2025 Negative     Bilirubin, UA 06/28/2025 Negative     Blood, UA 06/28/2025 Moderate (2+) (A)     Protein, UA 06/28/2025 Trace (A)     Leuk Esterase, UA 06/28/2025 Negative     Nitrite, UA 06/28/2025 Negative     Urobilinogen, UA 06/28/2025 0.2 E.U./dL     WBC 06/27/2025 16.89 (H)     RBC 06/27/2025 4.68     Hemoglobin 06/27/2025 14.4     Hematocrit 06/27/2025 42.6     MCV 06/27/2025 91.0     MCH 06/27/2025 30.8     MCHC 06/27/2025 33.8     RDW 06/27/2025 11.5 (L)     RDW-SD 06/27/2025 38.1     MPV 06/27/2025 10.2     Platelets 06/27/2025 312     Neutrophil % 06/27/2025 79.1 (H)     Lymphocyte % 06/27/2025 11.4 (L)     Monocyte % 06/27/2025 8.3     Eosinophil % 06/27/2025 0.2 (L)      Basophil % 06/27/2025 0.2     Immature Grans % 06/27/2025 0.8 (H)     Neutrophils, Absolute 06/27/2025 13.36 (H)     Lymphocytes, Absolute 06/27/2025 1.92     Monocytes, Absolute 06/27/2025 1.41 (H)     Eosinophils, Absolute 06/27/2025 0.03     Basophils, Absolute 06/27/2025 0.04     Immature Grans, Absolute 06/27/2025 0.13 (H)     nRBC 06/27/2025 0.0     Extra Tube 06/27/2025 Hold for add-ons.     Extra Tube 06/27/2025 hold for add-on     Extra Tube 06/27/2025 Hold for add-ons.     Extra Tube 06/27/2025 Hold for add-ons.     HCG Quantitative 06/27/2025 <0.50     Lactate 06/27/2025 2.5 (C)     Blood Culture 06/27/2025 No growth at 4 days     Blood Culture 06/27/2025 No growth at 4 days     Lactate 06/28/2025 3.2 (C)     RBC, UA 06/28/2025 11-20 (A)     WBC, UA 06/28/2025 0-2     Bacteria, UA 06/28/2025 None Seen     Squamous Epithelial Cell* 06/28/2025 0-2     Hyaline Casts, UA 06/28/2025 None Seen     Methodology 06/28/2025 Automated Microscopy    Clinical Support on 06/27/2025   Component Date Value    WBC 06/27/2025 20.64 (H)     RBC 06/27/2025 4.88     Hemoglobin 06/27/2025 15.1     Hematocrit 06/27/2025 45.2     MCV 06/27/2025 92.6     MCH 06/27/2025 30.9     MCHC 06/27/2025 33.4     RDW 06/27/2025 11.3 (L)     RDW-SD 06/27/2025 38.0     MPV 06/27/2025 10.9     Platelets 06/27/2025 347     Neutrophil % 06/27/2025 78.6 (H)     Lymphocyte % 06/27/2025 13.9 (L)     Monocyte % 06/27/2025 6.6     Eosinophil % 06/27/2025 0.1 (L)     Basophil % 06/27/2025 0.2     Immature Grans % 06/27/2025 0.6 (H)     Neutrophils, Absolute 06/27/2025 16.20 (H)     Lymphocytes, Absolute 06/27/2025 2.86     Monocytes, Absolute 06/27/2025 1.37 (H)     Eosinophils, Absolute 06/27/2025 0.03     Basophils, Absolute 06/27/2025 0.05     Immature Grans, Absolute 06/27/2025 0.13 (H)     nRBC 06/27/2025 0.0     Glucose 06/27/2025 81     BUN 06/27/2025 16.0     Creatinine 06/27/2025 0.83     Sodium 06/27/2025 142     Potassium 06/27/2025 4.6      Chloride 06/27/2025 106     CO2 06/27/2025 22.7     Calcium 06/27/2025 9.4     Total Protein 06/27/2025 7.2     Albumin 06/27/2025 4.6     ALT (SGPT) 06/27/2025 11     AST (SGOT) 06/27/2025 13     Alkaline Phosphatase 06/27/2025 62     Total Bilirubin 06/27/2025 0.6     Globulin 06/27/2025 2.6     A/G Ratio 06/27/2025 1.8     BUN/Creatinine Ratio 06/27/2025 19.3     Anion Gap 06/27/2025 13.3     eGFR 06/27/2025 96.8     EBV VCA IgM 06/27/2025 <36.0     EBV VCA IgG 06/27/2025 110.0 (H)     EBV Nuclear Antigen Ab, * 06/27/2025 >600.0 (H)     Interpretation 06/27/2025 Comment     Color, UA 06/27/2025 Dark Yellow (A)     Appearance, UA 06/27/2025 Cloudy (A)     pH, UA 06/27/2025 6.0     Specific [place], UA 06/27/2025 >1.030 (H)     Glucose, UA 06/27/2025 Negative     Ketones, UA 06/27/2025 Trace (A)     Bilirubin, UA 06/27/2025 Negative     Blood, UA 06/27/2025 Moderate (2+) (A)     Protein, UA 06/27/2025 100 mg/dL (2+) (A)     Leuk Esterase, UA 06/27/2025 Negative     Nitrite, UA 06/27/2025 Negative     Urobilinogen, UA 06/27/2025 1.0 E.U./dL     Extra Tube 06/27/2025 Hold for add-ons.     RBC, UA 06/27/2025 11-20 (A)     WBC, UA 06/27/2025 0-2     Bacteria, UA 06/27/2025 2+ (A)     Squamous Epithelial Cell* 06/27/2025 3-6 (A)     Hyaline Casts, UA 06/27/2025 3-6     Methodology 06/27/2025 Automated Microscopy    Office Visit on 06/25/2025   Component Date Value    ADENOVIRUS, PCR 06/25/2025 Not Detected     Coronavirus 229E 06/25/2025 Not Detected     Coronavirus HKU1 06/25/2025 Not Detected     Coronavirus NL63 06/25/2025 Not Detected     Coronavirus OC43 06/25/2025 Not Detected     COVID19 06/25/2025 Not Detected     Human Metapneumovirus 06/25/2025 Not Detected     Human Rhinovirus/Enterov* 06/25/2025 Not Detected     Influenza A PCR 06/25/2025 Not Detected     Influenza B PCR 06/25/2025 Not Detected     Parainfluenza Virus 1 06/25/2025 Not Detected     Parainfluenza Virus 2 06/25/2025 Not Detected      Parainfluenza Virus 3 06/25/2025 Not Detected     Parainfluenza Virus 4 06/25/2025 Not Detected     RSV, PCR 06/25/2025 Not Detected     Bordetella pertussis pcr 06/25/2025 Not Detected     Bordetella parapertussis* 06/25/2025 Not Detected     Chlamydophila pneumoniae* 06/25/2025 Not Detected     Mycoplasma pneumo by PCR 06/25/2025 Not Detected     WBC 06/25/2025 20.39 (H)     RBC 06/25/2025 4.67     Hemoglobin 06/25/2025 14.4     Hematocrit 06/25/2025 44.2     MCV 06/25/2025 94.6     MCH 06/25/2025 30.8     MCHC 06/25/2025 32.6     RDW 06/25/2025 11.7 (L)     RDW-SD 06/25/2025 39.7     MPV 06/25/2025 10.5     Platelets 06/25/2025 318     Neutrophil % 06/25/2025 90.6 (H)     Lymphocyte % 06/25/2025 5.2 (L)     Monocyte % 06/25/2025 3.7 (L)     Eosinophil % 06/25/2025 0.0 (L)     Basophil % 06/25/2025 0.1     Immature Grans % 06/25/2025 0.4     Neutrophils, Absolute 06/25/2025 18.47 (H)     Lymphocytes, Absolute 06/25/2025 1.06     Monocytes, Absolute 06/25/2025 0.76     Eosinophils, Absolute 06/25/2025 0.00     Basophils, Absolute 06/25/2025 0.02     Immature Grans, Absolute 06/25/2025 0.08 (H)     nRBC 06/25/2025 0.0     Glucose 06/25/2025 114 (H)     BUN 06/25/2025 12.0     Creatinine 06/25/2025 0.71     Sodium 06/25/2025 137     Potassium 06/25/2025 3.9     Chloride 06/25/2025 106     CO2 06/25/2025 20.6 (L)     Calcium 06/25/2025 9.5     Total Protein 06/25/2025 7.2     Albumin 06/25/2025 4.2     ALT (SGPT) 06/25/2025 13     AST (SGOT) 06/25/2025 13     Alkaline Phosphatase 06/25/2025 63     Total Bilirubin 06/25/2025 0.5     Globulin 06/25/2025 3.0     A/G Ratio 06/25/2025 1.4     BUN/Creatinine Ratio 06/25/2025 16.9     Anion Gap 06/25/2025 10.4     eGFR 06/25/2025 116.7    Office Visit on 06/19/2025   Component Date Value    Rapid Strep A Screen 06/19/2025 Negative     Internal Control 06/19/2025 Passed     Lot Number 06/19/2025 [id number]     Expiration Date 06/19/2025 04/30/2026     SARS Antigen 06/19/2025 Not  Detected     Influenza A Antigen [facility] 06/19/2025 Not Detected     Influenza B Antigen [facility] 06/19/2025 Detected (A)     Internal Control 06/19/2025 Passed     Lot Number 06/19/2025 [id number]     Expiration Date 06/19/2025 03/03/2026    Office Visit on 06/09/2025   Component Date Value    Color 06/09/2025 Dark Yellow     Clarity, UA 06/09/2025 Clear     Specific Gravity  06/09/2025 1.015     pH, Urine 06/09/2025 6.5     Leukocytes 06/09/2025 Negative     Nitrite, UA 06/09/2025 Negative     Protein, POC 06/09/2025 30 mg/dL (A)     Glucose, UA 06/09/2025 Negative     Ketones, UA 06/09/2025 Negative     Urobilinogen, UA 06/09/2025 0.2 E.U./dL     Bilirubin 06/09/2025 Negative     Blood, UA 06/09/2025 Large (A)     Lot Number 06/09/2025 [id number]     Expiration Date 06/09/2025 01/24/2027     Urine Culture 06/09/2025 No growth     Folate 06/09/2025 5.37     Vitamin B-12 06/09/2025 643     25 Hydroxy, Vitamin D 06/09/2025 29.5 (L)     TSH 06/09/2025 0.240 (L)     Free T4 06/09/2025 1.84 (H)     WBC 06/09/2025 7.94     RBC 06/09/2025 4.95     Hemoglobin 06/09/2025 15.8     Hematocrit 06/09/2025 46.4     MCV 06/09/2025 93.7     MCH 06/09/2025 31.9     MCHC 06/09/2025 34.1     RDW 06/09/2025 11.8 (L)     RDW-SD 06/09/2025 40.8     MPV 06/09/2025 11.1     Platelets 06/09/2025 289     Glucose 06/09/2025 72     BUN 06/09/2025 10.0     Creatinine 06/09/2025 0.87     Sodium 06/09/2025 136     Potassium 06/09/2025 3.4 (L)     Chloride 06/09/2025 101     CO2 06/09/2025 24.0     Calcium 06/09/2025 9.0     Total Protein 06/09/2025 7.4     Albumin 06/09/2025 4.5     ALT (SGPT) 06/09/2025 14     AST (SGOT) 06/09/2025 17     Alkaline Phosphatase 06/09/2025 66     Total Bilirubin 06/09/2025 0.7     Globulin 06/09/2025 2.9     A/G Ratio 06/09/2025 1.6     BUN/Creatinine Ratio 06/09/2025 11.5     Anion Gap 06/09/2025 11.0     eGFR 06/09/2025 91.5     Ferritin 06/09/2025 65.10     Iron 06/09/2025 91     Iron Saturation (TSAT) 06/09/2025 24      Transferrin 06/09/2025 259     TIBC 06/09/2025 386     Diagnosis 06/09/2025 Comment     Specimen adequacy: 06/09/2025 Comment     Clinician Provided ICD-1* 06/09/2025 Comment     Performed by: 06/09/2025 Comment     . 06/09/2025 .     Note: 06/09/2025 Comment     Method: 06/09/2025 Comment     HPV Aptima 06/09/2025 Negative     Magnesium 06/09/2025 2.1     Bacterial vaginosis 06/09/2025 Negative     Candida glabrata/krusei 06/09/2025 Not Detected     JENNA GROUP 06/09/2025 Not Detected     Trichomonas vaginalis 06/09/2025 Not Detected        Results  Labs   - Blood culture: No growth   - Lactic acid levels: Slightly elevated   - White blood count: Decreased from 20 to 16      Procedures        Assessment and Plan   Diagnoses and all orders for this visit:    1. Increased lactic acid level (Primary)  -     Lactic Acid, Plasma    2. Body aches  -     CBC Auto Differential  -     Comprehensive Metabolic Panel    3. Leukocytosis, unspecified type  -     MVP Vaginosis Panel - Swab, Vagina  -     Urinalysis With Culture If Indicated - Urine, Clean Catch  -     [place] Urine Culture Tube - Urine, Clean Catch  -     Urinalysis, Microscopic Only - Urine, Clean Catch    4. Allergy, subsequent encounter  -     azelastine (ASTELIN) 0.1 % nasal spray; Administer 2 sprays into the nostril(s) as directed by provider 2 (Two) Times a Day. Use in each nostril as directed  Dispense: 30 mL; Refill: 12        Assessment & Plan  1. Hospital follow-up.  - Symptoms suggest possible mononucleosis contributing to fatigue; slight elevation in lactic acid levels may be due to infection or sepsis, although unlikely given negative blood cultures.  - White blood cell count decreased from 20 to 16, indicating positive response to treatment.  - Comprehensive metabolic panel (CMP), complete blood count (CBC), vaginal swab, lactic acid test, and follow-up urinalysis will be conducted.  - Advised to seek immediate medical attention at the  emergency room if experiencing fever, changes in symptoms, dizziness, or loss of consciousness.    2. Allergic rhinitis.  - Reports experiencing allergies and hoarseness; currently taking [employer] generic cetirizine.  - Advised to continue using Flonase nasal spray, especially when feeling congested.  - Azelastine (Astelin) can be used daily to manage allergies; can continue taking Zyrtec as needed but should avoid excessive dryness to prevent nasal bleeding.    3. Peripheral cyanosis.  - Reports feet turn reddish-purple when sitting, possibly due to lack of blood flow; no numbness or tingling present, but experiences dizziness when standing up.  - Further evaluation may be needed if symptoms persist.    Follow-up  - Will follow up with Sonya next week.      FOLLOW UP  Return in about 1 week (around 7/9/2025) for Recheck, Follow up with PCP.    Patient was given instructions and counseling regarding her condition or for health maintenance advice. Please see specific information pulled into the AVS if appropriate.       ANTONIA Parkash  07/02/25  18:41 EDT    CURRENT & DISCONTINUED MEDICATIONS  Current Outpatient Medications   Medication Instructions    amphetamine-dextroamphetamine (Adderall) 10 MG tablet 10 mg, Oral, Daily    amphetamine-dextroamphetamine XR (Adderall XR) 20 MG 24 hr capsule 20 mg, Oral, Every Morning    azelastine (ASTELIN) 0.1 % nasal spray 2 sprays, Nasal, 2 Times Daily, Use in each nostril as directed    cetirizine (zyrTEC) 10 MG tablet Zyrtec 10 mg oral tablet take 1 tablet (10 mg) by oral route once daily   Active    doxycycline (VIBRAMYCIN) 100 mg, Oral, 2 Times Daily    FLUoxetine (PROzac) 40 MG capsule No dose, route, or frequency recorded.    FLUoxetine (PROZAC) 10 mg, Oral, Daily, Take with current 40 mg po dose to equal 50 mg po q day.    fluticasone (FLONASE) 50 MCG/ACT nasal spray 2 sprays, Nasal, Daily    folic acid (FOLVITE) 1,000 mcg, Oral, Daily    nitrofurantoin  (macrocrystal-monohydrate) (MACROBID) 100 mg, Oral, 2 Times Daily    propranolol (INDERAL) 20 MG tablet TAKE 0.5 - 1 TABLET BY MOUTH TWICE DAILY    SEMAGLUTIDE-WEIGHT MANAGEMENT SC Inject  under the skin into the appropriate area as directed. 0.5MG PER INJECTION ONCE PER WEEK       Medications Discontinued During This Encounter   Medication Reason    oseltamivir (Tamiflu) 75 MG capsule Patient Reported Not Taking    methylPREDNISolone (MEDROL) 4 MG dose pack Patient Reported Not Taking    valACYclovir (VALTREX) 1000 MG tablet Patient Reported Not Taking    brompheniramine-pseudoephedrine-DM 30-2-10 MG/5ML syrup Patient Reported Not Taking    benzonatate (Tessalon Perles) 100 MG capsule Patient Reported Not Taking        EMR Dragon/Transcription disclaimer:  Parts of this encounter note are electronic transcription/translation of spoken language to printed text.     Patient or patient representative verbalized consent for the use of Ambient Listening during the visit with  ANTONIA Prakash for chart documentation. 7/2/2025  18:41 EDT

## 2025-07-10 DIAGNOSIS — F90.9 ADULT ADHD: ICD-10-CM

## 2025-07-10 RX ORDER — DEXTROAMPHETAMINE SACCHARATE, AMPHETAMINE ASPARTATE, DEXTROAMPHETAMINE SULFATE AND AMPHETAMINE SULFATE 2.5; 2.5; 2.5; 2.5 MG/1; MG/1; MG/1; MG/1
10 TABLET ORAL DAILY
Qty: 30 TABLET | Refills: 0 | Status: SHIPPED | OUTPATIENT
Start: 2025-07-15 | End: 2025-08-14

## 2025-07-10 NOTE — TELEPHONE ENCOUNTER
REFILL REQUEST:    amphetamine-dextroamphetamine (Adderall) 10 MG tablet (06/16/2025)     F/UP: 08/11/2025.  LOV: 06/09/2025.    LAST UDS:  Urine Drug Screen - Urine, Clean Catch (04/07/2025 16:20)     LAST CSA:  CONTROLLED SUBSTANCE AGREEMENT - SCAN - [facility], , [phone] (04/11/2025)

## 2025-07-20 DIAGNOSIS — F90.9 ADULT ADHD: ICD-10-CM

## 2025-07-21 RX ORDER — DEXTROAMPHETAMINE SACCHARATE, AMPHETAMINE ASPARTATE MONOHYDRATE, DEXTROAMPHETAMINE SULFATE AND AMPHETAMINE SULFATE 5; 5; 5; 5 MG/1; MG/1; MG/1; MG/1
20 CAPSULE, EXTENDED RELEASE ORAL EVERY MORNING
Qty: 30 CAPSULE | Refills: 0 | Status: SHIPPED | OUTPATIENT
Start: 2025-07-27 | End: 2025-07-25

## 2025-07-21 NOTE — TELEPHONE ENCOUNTER
CONTROLLED MEDICATION REFILL REQUEST    STATE REGULATION APPT EVERY 3 MONTHS     UDS(URINE DRUG SCREEN) EVERY 6 MONTHS OR UP TO PROVIDER PREFERENCE   (LANG GLASGOW & EMILIANA 1 PER YEAR)     NEW NARC CONSENT EVERY YEAR      MEDICATION: amphetamine-dextroamphetamine XR (Adderall XR) 20 MG 24 hr capsule (06/28/2025)  amphetamine-dextroamphetamine (Adderall) 10 MG tablet (07/15/2025)     NEXT OFFICE VISIT: Appointment with Olga Jiang APRN (08/11/2025)     LAST OFFICE VISIT: Office Visit with Olga Jiang APRN (06/09/2025)     NARC CONSENT: CONTROLLED SUBSTANCE AGREEMENT - SCAN - [facility], , [phone] (04/11/2025)     URINE DRUG SCREEN(STANDING ORDER)   (LANG GLASGOW & EMILIANA 1 PER YEAR): Urine Drug Screen - Urine, Clean Catch (04/07/2025 16:20)  Fentanyl, Urine - Urine, Clean Catch (04/07/2025 16:20)    PROVIDER PLEASE ADVISE

## 2025-07-22 ENCOUNTER — TELEPHONE (OUTPATIENT)
Dept: CARDIOLOGY | Facility: CLINIC | Age: 30
End: 2025-07-22

## 2025-07-24 NOTE — TELEPHONE ENCOUNTER
PT(PATIENT) VERIFIED     PT(PATIENT) STATES SHE IS GOING OUT OF TOWN ON VACATION  -      amphetamine-dextroamphetamine XR (Adderall XR) 20 MG 24 hr capsule (2025)  amphetamine-dextroamphetamine (Adderall) 10 MG tablet (07/15/2025)    PT(PATIENT) REQUESTED AN EARLY REFILL OF MEDICATION

## 2025-07-25 RX ORDER — DEXTROAMPHETAMINE SACCHARATE, AMPHETAMINE ASPARTATE MONOHYDRATE, DEXTROAMPHETAMINE SULFATE AND AMPHETAMINE SULFATE 5; 5; 5; 5 MG/1; MG/1; MG/1; MG/1
20 CAPSULE, EXTENDED RELEASE ORAL EVERY MORNING
Qty: 30 CAPSULE | Refills: 0 | Status: SHIPPED | OUTPATIENT
Start: 2025-07-25 | End: 2025-08-24

## 2025-07-25 NOTE — ADDENDUM NOTE
Addended by: DAVID ALCALA on: 7/25/2025 09:45 AM     Modules accepted: Orders     LMOR for a call back to r/s

## 2025-08-11 ENCOUNTER — OFFICE VISIT (OUTPATIENT)
Dept: PSYCHIATRY | Facility: CLINIC | Age: 30
End: 2025-08-11
Payer: COMMERCIAL

## 2025-08-11 VITALS
DIASTOLIC BLOOD PRESSURE: 73 MMHG | OXYGEN SATURATION: 97 % | HEART RATE: 81 BPM | HEIGHT: 66 IN | WEIGHT: 180 LBS | BODY MASS INDEX: 28.93 KG/M2 | SYSTOLIC BLOOD PRESSURE: 137 MMHG

## 2025-08-11 DIAGNOSIS — F40.10 SOCIAL ANXIETY DISORDER: ICD-10-CM

## 2025-08-11 DIAGNOSIS — F90.9 ADULT ADHD: ICD-10-CM

## 2025-08-11 DIAGNOSIS — F51.04 INSOMNIA, PSYCHOPHYSIOLOGICAL: ICD-10-CM

## 2025-08-11 DIAGNOSIS — F39 MOOD DISORDER: Primary | ICD-10-CM

## 2025-08-11 PROCEDURE — 99214 OFFICE O/P EST MOD 30 MIN: CPT

## 2025-08-11 PROCEDURE — 1160F RVW MEDS BY RX/DR IN RCRD: CPT

## 2025-08-11 PROCEDURE — 1159F MED LIST DOCD IN RCRD: CPT

## 2025-08-11 RX ORDER — DEXTROAMPHETAMINE SACCHARATE, AMPHETAMINE ASPARTATE, DEXTROAMPHETAMINE SULFATE AND AMPHETAMINE SULFATE 2.5; 2.5; 2.5; 2.5 MG/1; MG/1; MG/1; MG/1
10 TABLET ORAL DAILY
Qty: 30 TABLET | Refills: 0 | Status: SHIPPED | OUTPATIENT
Start: 2025-08-14 | End: 2025-08-14 | Stop reason: SDUPTHER

## 2025-08-11 RX ORDER — BUPROPION HYDROCHLORIDE 150 MG/1
150 TABLET ORAL EVERY MORNING
Qty: 30 TABLET | Refills: 2 | Status: SHIPPED | OUTPATIENT
Start: 2025-08-11 | End: 2025-11-09

## 2025-08-13 ENCOUNTER — OFFICE VISIT (OUTPATIENT)
Dept: FAMILY MEDICINE CLINIC | Facility: CLINIC | Age: 30
End: 2025-08-13
Payer: COMMERCIAL

## 2025-08-13 VITALS
TEMPERATURE: 98.2 F | BODY MASS INDEX: 28.12 KG/M2 | SYSTOLIC BLOOD PRESSURE: 120 MMHG | HEIGHT: 66 IN | HEART RATE: 94 BPM | DIASTOLIC BLOOD PRESSURE: 80 MMHG | OXYGEN SATURATION: 99 % | WEIGHT: 175 LBS

## 2025-08-13 DIAGNOSIS — R50.9 FEVER, UNSPECIFIED FEVER CAUSE: ICD-10-CM

## 2025-08-13 DIAGNOSIS — Z20.822 CLOSE EXPOSURE TO COVID-19 VIRUS: Primary | ICD-10-CM

## 2025-08-13 DIAGNOSIS — N89.8 VAGINAL DISCHARGE: ICD-10-CM

## 2025-08-13 LAB
BACTERIAL VAGINOSIS VAG-IMP: NEGATIVE
CANDIDA DNA VAG QL NAA+PROBE: NOT DETECTED
CANDIDA DNA VAG QL NAA+PROBE: NOT DETECTED
EXPIRATION DATE: NORMAL
FLUAV AG UPPER RESP QL IA.RAPID: NOT DETECTED
FLUBV AG UPPER RESP QL IA.RAPID: NOT DETECTED
INTERNAL CONTROL: NORMAL
Lab: NORMAL
SARS-COV-2 AG UPPER RESP QL IA.RAPID: NOT DETECTED
T VAGINALIS DNA VAG QL NAA+PROBE: NOT DETECTED

## 2025-08-13 PROCEDURE — 81515 NFCT DS BV&VAGINITIS DNA ALG: CPT

## 2025-08-13 RX ORDER — SODIUM FLUORIDE 1.1 G/100G
CREAM ORAL
COMMUNITY
Start: 2025-08-13

## 2025-08-14 DIAGNOSIS — F90.9 ADULT ADHD: ICD-10-CM

## 2025-08-14 RX ORDER — DEXTROAMPHETAMINE SACCHARATE, AMPHETAMINE ASPARTATE, DEXTROAMPHETAMINE SULFATE AND AMPHETAMINE SULFATE 2.5; 2.5; 2.5; 2.5 MG/1; MG/1; MG/1; MG/1
10 TABLET ORAL DAILY
Qty: 30 TABLET | Refills: 0 | Status: SHIPPED | OUTPATIENT
Start: 2025-08-14 | End: 2025-08-14 | Stop reason: SDUPTHER

## 2025-08-15 RX ORDER — DEXTROAMPHETAMINE SACCHARATE, AMPHETAMINE ASPARTATE, DEXTROAMPHETAMINE SULFATE AND AMPHETAMINE SULFATE 2.5; 2.5; 2.5; 2.5 MG/1; MG/1; MG/1; MG/1
10 TABLET ORAL DAILY
Qty: 30 TABLET | Refills: 0 | Status: SHIPPED | OUTPATIENT
Start: 2025-08-15 | End: 2025-09-14

## 2025-08-15 RX ORDER — DEXTROAMPHETAMINE SACCHARATE, AMPHETAMINE ASPARTATE MONOHYDRATE, DEXTROAMPHETAMINE SULFATE AND AMPHETAMINE SULFATE 5; 5; 5; 5 MG/1; MG/1; MG/1; MG/1
20 CAPSULE, EXTENDED RELEASE ORAL EVERY MORNING
Qty: 30 CAPSULE | Refills: 0 | Status: SHIPPED | OUTPATIENT
Start: 2025-08-25 | End: 2025-09-24

## 2025-08-22 ENCOUNTER — TELEPHONE (OUTPATIENT)
Dept: PSYCHIATRY | Facility: CLINIC | Age: 30
End: 2025-08-22
Payer: COMMERCIAL

## 2025-08-25 DIAGNOSIS — F90.9 ADULT ADHD: ICD-10-CM

## 2025-08-25 RX ORDER — DEXTROAMPHETAMINE SACCHARATE, AMPHETAMINE ASPARTATE MONOHYDRATE, DEXTROAMPHETAMINE SULFATE AND AMPHETAMINE SULFATE 5; 5; 5; 5 MG/1; MG/1; MG/1; MG/1
20 CAPSULE, EXTENDED RELEASE ORAL EVERY MORNING
Qty: 30 CAPSULE | Refills: 0 | Status: SHIPPED | OUTPATIENT
Start: 2025-08-25 | End: 2025-08-26

## 2025-08-25 RX ORDER — VALACYCLOVIR HYDROCHLORIDE 1 G/1
1000 TABLET, FILM COATED ORAL 2 TIMES DAILY PRN
Qty: 30 TABLET | Refills: 2 | Status: SHIPPED | OUTPATIENT
Start: 2025-08-25

## 2025-08-26 RX ORDER — DEXTROAMPHETAMINE SACCHARATE, AMPHETAMINE ASPARTATE MONOHYDRATE, DEXTROAMPHETAMINE SULFATE AND AMPHETAMINE SULFATE 5; 5; 5; 5 MG/1; MG/1; MG/1; MG/1
20 CAPSULE, EXTENDED RELEASE ORAL EVERY MORNING
Qty: 30 CAPSULE | Refills: 0 | Status: SHIPPED | OUTPATIENT
Start: 2025-08-26 | End: 2025-09-25